# Patient Record
Sex: MALE | Race: WHITE | NOT HISPANIC OR LATINO | Employment: OTHER | URBAN - METROPOLITAN AREA
[De-identification: names, ages, dates, MRNs, and addresses within clinical notes are randomized per-mention and may not be internally consistent; named-entity substitution may affect disease eponyms.]

---

## 2024-08-10 ENCOUNTER — HOSPITAL ENCOUNTER (EMERGENCY)
Facility: HOSPITAL | Age: 84
Discharge: HOME | End: 2024-08-11
Attending: EMERGENCY MEDICINE
Payer: OTHER GOVERNMENT

## 2024-08-10 ENCOUNTER — APPOINTMENT (OUTPATIENT)
Dept: RADIOLOGY | Facility: HOSPITAL | Age: 84
End: 2024-08-10
Payer: OTHER GOVERNMENT

## 2024-08-10 DIAGNOSIS — R00.2 PALPITATIONS: Primary | ICD-10-CM

## 2024-08-10 LAB
ALBUMIN SERPL BCP-MCNC: 4.2 G/DL (ref 3.4–5)
ALP SERPL-CCNC: 89 U/L (ref 33–136)
ALT SERPL W P-5'-P-CCNC: 18 U/L (ref 10–52)
ANION GAP SERPL CALC-SCNC: 11 MMOL/L (ref 10–20)
AST SERPL W P-5'-P-CCNC: 26 U/L (ref 9–39)
BASOPHILS # BLD AUTO: 0.03 X10*3/UL (ref 0–0.1)
BASOPHILS NFR BLD AUTO: 0.3 %
BILIRUB SERPL-MCNC: 0.4 MG/DL (ref 0–1.2)
BUN SERPL-MCNC: 17 MG/DL (ref 6–23)
CALCIUM SERPL-MCNC: 10.2 MG/DL (ref 8.6–10.3)
CARDIAC TROPONIN I PNL SERPL HS: 40 NG/L (ref 0–20)
CHLORIDE SERPL-SCNC: 97 MMOL/L (ref 98–107)
CO2 SERPL-SCNC: 30 MMOL/L (ref 21–32)
CREAT SERPL-MCNC: 0.77 MG/DL (ref 0.5–1.3)
EGFRCR SERPLBLD CKD-EPI 2021: 88 ML/MIN/1.73M*2
EOSINOPHIL # BLD AUTO: 0.02 X10*3/UL (ref 0–0.4)
EOSINOPHIL NFR BLD AUTO: 0.2 %
ERYTHROCYTE [DISTWIDTH] IN BLOOD BY AUTOMATED COUNT: 12 % (ref 11.5–14.5)
GLUCOSE BLD MANUAL STRIP-MCNC: 132 MG/DL (ref 74–99)
GLUCOSE SERPL-MCNC: 145 MG/DL (ref 74–99)
HCT VFR BLD AUTO: 45.7 % (ref 41–52)
HGB BLD-MCNC: 15.1 G/DL (ref 13.5–17.5)
IMM GRANULOCYTES # BLD AUTO: 0.05 X10*3/UL (ref 0–0.5)
IMM GRANULOCYTES NFR BLD AUTO: 0.5 % (ref 0–0.9)
LYMPHOCYTES # BLD AUTO: 1.5 X10*3/UL (ref 0.8–3)
LYMPHOCYTES NFR BLD AUTO: 14.7 %
MAGNESIUM SERPL-MCNC: 1.77 MG/DL (ref 1.6–2.4)
MCH RBC QN AUTO: 30.9 PG (ref 26–34)
MCHC RBC AUTO-ENTMCNC: 33 G/DL (ref 32–36)
MCV RBC AUTO: 94 FL (ref 80–100)
MONOCYTES # BLD AUTO: 0.28 X10*3/UL (ref 0.05–0.8)
MONOCYTES NFR BLD AUTO: 2.8 %
NEUTROPHILS # BLD AUTO: 8.3 X10*3/UL (ref 1.6–5.5)
NEUTROPHILS NFR BLD AUTO: 81.5 %
NRBC BLD-RTO: 0 /100 WBCS (ref 0–0)
PHOSPHATE SERPL-MCNC: 2.7 MG/DL (ref 2.5–4.9)
PLATELET # BLD AUTO: 176 X10*3/UL (ref 150–450)
POTASSIUM SERPL-SCNC: 3.7 MMOL/L (ref 3.5–5.3)
PROT SERPL-MCNC: 8.4 G/DL (ref 6.4–8.2)
RBC # BLD AUTO: 4.88 X10*6/UL (ref 4.5–5.9)
SODIUM SERPL-SCNC: 134 MMOL/L (ref 136–145)
WBC # BLD AUTO: 10.2 X10*3/UL (ref 4.4–11.3)

## 2024-08-10 PROCEDURE — 84100 ASSAY OF PHOSPHORUS: CPT | Performed by: EMERGENCY MEDICINE

## 2024-08-10 PROCEDURE — 82947 ASSAY GLUCOSE BLOOD QUANT: CPT

## 2024-08-10 PROCEDURE — 96360 HYDRATION IV INFUSION INIT: CPT

## 2024-08-10 PROCEDURE — 83735 ASSAY OF MAGNESIUM: CPT | Performed by: EMERGENCY MEDICINE

## 2024-08-10 PROCEDURE — 85025 COMPLETE CBC W/AUTO DIFF WBC: CPT | Performed by: EMERGENCY MEDICINE

## 2024-08-10 PROCEDURE — 71045 X-RAY EXAM CHEST 1 VIEW: CPT

## 2024-08-10 PROCEDURE — 71045 X-RAY EXAM CHEST 1 VIEW: CPT | Performed by: RADIOLOGY

## 2024-08-10 PROCEDURE — 84484 ASSAY OF TROPONIN QUANT: CPT | Performed by: EMERGENCY MEDICINE

## 2024-08-10 PROCEDURE — 2500000004 HC RX 250 GENERAL PHARMACY W/ HCPCS (ALT 636 FOR OP/ED): Performed by: EMERGENCY MEDICINE

## 2024-08-10 PROCEDURE — 99283 EMERGENCY DEPT VISIT LOW MDM: CPT | Mod: 25

## 2024-08-10 PROCEDURE — 80053 COMPREHEN METABOLIC PANEL: CPT | Performed by: EMERGENCY MEDICINE

## 2024-08-10 PROCEDURE — 36415 COLL VENOUS BLD VENIPUNCTURE: CPT | Performed by: EMERGENCY MEDICINE

## 2024-08-10 RX ORDER — LANOLIN ALCOHOL/MO/W.PET/CERES
800 CREAM (GRAM) TOPICAL ONCE
Status: COMPLETED | OUTPATIENT
Start: 2024-08-10 | End: 2024-08-11

## 2024-08-10 ASSESSMENT — COLUMBIA-SUICIDE SEVERITY RATING SCALE - C-SSRS
1. IN THE PAST MONTH, HAVE YOU WISHED YOU WERE DEAD OR WISHED YOU COULD GO TO SLEEP AND NOT WAKE UP?: NO
6. HAVE YOU EVER DONE ANYTHING, STARTED TO DO ANYTHING, OR PREPARED TO DO ANYTHING TO END YOUR LIFE?: NO
2. HAVE YOU ACTUALLY HAD ANY THOUGHTS OF KILLING YOURSELF?: NO

## 2024-08-10 ASSESSMENT — PAIN - FUNCTIONAL ASSESSMENT: PAIN_FUNCTIONAL_ASSESSMENT: 0-10

## 2024-08-10 ASSESSMENT — LIFESTYLE VARIABLES
TOTAL SCORE: 0
EVER HAD A DRINK FIRST THING IN THE MORNING TO STEADY YOUR NERVES TO GET RID OF A HANGOVER: NO
EVER FELT BAD OR GUILTY ABOUT YOUR DRINKING: NO
HAVE YOU EVER FELT YOU SHOULD CUT DOWN ON YOUR DRINKING: NO
HAVE PEOPLE ANNOYED YOU BY CRITICIZING YOUR DRINKING: NO

## 2024-08-10 ASSESSMENT — PAIN SCALES - GENERAL: PAINLEVEL_OUTOF10: 0 - NO PAIN

## 2024-08-11 VITALS
TEMPERATURE: 98.2 F | RESPIRATION RATE: 18 BRPM | OXYGEN SATURATION: 99 % | SYSTOLIC BLOOD PRESSURE: 120 MMHG | HEIGHT: 64 IN | DIASTOLIC BLOOD PRESSURE: 73 MMHG | BODY MASS INDEX: 20.49 KG/M2 | HEART RATE: 80 BPM | WEIGHT: 120 LBS

## 2024-08-11 LAB — CARDIAC TROPONIN I PNL SERPL HS: 41 NG/L (ref 0–20)

## 2024-08-11 PROCEDURE — 2500000004 HC RX 250 GENERAL PHARMACY W/ HCPCS (ALT 636 FOR OP/ED): Performed by: EMERGENCY MEDICINE

## 2024-08-11 PROCEDURE — 36415 COLL VENOUS BLD VENIPUNCTURE: CPT | Performed by: EMERGENCY MEDICINE

## 2024-08-11 PROCEDURE — 84484 ASSAY OF TROPONIN QUANT: CPT | Performed by: EMERGENCY MEDICINE

## 2024-08-11 NOTE — ED TRIAGE NOTES
Pt had a procedure done yesterday, and today he forgot to take am metoprolol later felt like heart rate was racing rate 109 and then took metoprolol and rate came down. Denies pain now

## 2024-08-11 NOTE — ED PROVIDER NOTES
Emergency Department Provider Note        MEDICAL DECISION MAKING:  Medical Decision Making  Amount and/or Complexity of Data Reviewed  External Data Reviewed: notes.  Labs: ordered. Decision-making details documented in ED Course.  Radiology: ordered. Decision-making details documented in ED Course.  ECG/medicine tests: ordered and independent interpretation performed. Decision-making details documented in ED Course.    Risk  Decision regarding hospitalization.         8/10/24     Chief Complaint   Patient presents with    Rapid Heart Rate     Pt had a procedure done yesterday, and today he forgot to take am metoprolol later felt like heart rate was racing rate 109 and then took metoprolol and rate came down. Denies pain now         History/Exam limitations: none.   Additional history was obtained from patient and relative(s).    HPI: Maksim Palmer  is a 84 y.o. presents with fast heart rate that has now resolved.  Family concerned about dehydration.  Denies chest pain, shortness of breath, fever, chills, nausea, vomiting, abdominal pain or chest pain.  Past medical records, Past medical history and surgical history reviewed and as documented.  History reviewed and as noted. past medical records reviewed.    Active Ambulatory Problems     Diagnosis Date Noted    No Active Ambulatory Problems     Resolved Ambulatory Problems     Diagnosis Date Noted    No Resolved Ambulatory Problems     No Additional Past Medical History        History reviewed. No pertinent surgical history.     No family history on file.     Social History     Tobacco Use    Smoking status: Never    Smokeless tobacco: Never   Vaping Use    Vaping status: Never Used   Substance Use Topics    Alcohol use: Never    Drug use: Never        Allergies   Allergen Reactions    Heparin Anaphylaxis    Codeine Rash    Penicillin Rash        Unless otherwise stated in this report the patient's positive and negative responses for review of systems for  "constitutional, eyes, ENT, cardiovascular, respiratory, gastrointestinal, neurological, genitourinary, musculoskeletal, and integument systems and related systems to the presenting problem are either as stated in the HPI or were not pertinent or were negative for the symptoms and/or complaints related to the presenting medical problem.    PHYSICAL EXAM  Triage/nursing notes and vital signs reviewed as available and as noted    Vitals:    08/10/24 2141 08/10/24 2159 08/10/24 2215 08/10/24 2233   BP: 142/82  170/77 133/67   BP Location: Left arm      Patient Position: Sitting      Pulse: 76  71 71   Resp: 16  16 18   Temp: 36.8 °C (98.2 °F)      TempSrc: Temporal      SpO2: 97% 97% 97% 98%   Weight: 54.4 kg (120 lb)      Height: 1.626 m (5' 4\")            Vital Signs Reviewed and as noted above    General Appearance  Appears well  Alert  No Distress    Neurological  Alert and conversant   Appropriate orientation   Speech Clear   Moves all extremities spontaneously    Neck  Nml Inspection  No Kernig/Brudzinski's  Trachea midline    HEENT  Head Atraumatic  Eyes Nml Inspection  Mucosa Moist    Respiratory  Respirations nonlabored  Symmetrical expansion  Lungs sounds clear    Cardiovascular  Rate Normal  Rhythm regular  Normal heart sounds  Pulses full,equal,   Brisk Capillary Refill    Abdomen/Pelvis  Bowel Sounds Present  Abdomen soft  Non-Tender  No distention    Extremity  Normal Appearance  No Pedal Edema    Skin  Color Nml  Warm, Dry    Psychiatric  Cooperative, no apparent risk to self or others    ED COURSE  Current subjective and objective findings, differential diagnosis includes but not limited to pneumonia, acute coronary syndrome, NSTEMI, dehydration, cardiac arrhythmia      Work-up performed to evaluate for differential diagnosis as clinically indicated   Orders Placed This Encounter   Procedures    XR chest 1 view    CBC and Auto Differential    Phosphorus    Magnesium    Comprehensive metabolic panel    " Troponin I, High Sensitivity    Troponin I, High Sensitivity    Vital Signs    POCT GLUCOSE          Labs and imaging reviewed by me  and note           Labs Reviewed   CBC WITH AUTO DIFFERENTIAL - Abnormal       Result Value    WBC 10.2      nRBC 0.0      RBC 4.88      Hemoglobin 15.1      Hematocrit 45.7      MCV 94      MCH 30.9      MCHC 33.0      RDW 12.0      Platelets 176      Neutrophils % 81.5      Immature Granulocytes %, Automated 0.5      Lymphocytes % 14.7      Monocytes % 2.8      Eosinophils % 0.2      Basophils % 0.3      Neutrophils Absolute 8.30 (*)     Immature Granulocytes Absolute, Automated 0.05      Lymphocytes Absolute 1.50      Monocytes Absolute 0.28      Eosinophils Absolute 0.02      Basophils Absolute 0.03     COMPREHENSIVE METABOLIC PANEL - Abnormal    Glucose 145 (*)     Sodium 134 (*)     Potassium 3.7      Chloride 97 (*)     Bicarbonate 30      Anion Gap 11      Urea Nitrogen 17      Creatinine 0.77      eGFR 88      Calcium 10.2      Albumin 4.2      Alkaline Phosphatase 89      Total Protein 8.4 (*)     AST 26      Bilirubin, Total 0.4      ALT 18     TROPONIN I, HIGH SENSITIVITY - Abnormal    Troponin I, High Sensitivity 40 (*)     Narrative:     Less than 99th percentile of normal range cutoff-  Female and children under 18 years old <14 ng/L; Male <21 ng/L: Negative  Repeat testing should be performed if clinically indicated.     Female and children under 18 years old 14-50 ng/L; Male 21-50 ng/L:  Consistent with possible cardiac damage and possible increased clinical   risk. Serial measurements may help to assess extent of myocardial damage.     >50 ng/L: Consistent with cardiac damage, increased clinical risk and  myocardial infarction. Serial measurements may help assess extent of   myocardial damage.      NOTE: Children less than 1 year old may have higher baseline troponin   levels and results should be interpreted in conjunction with the overall   clinical context.      NOTE: Troponin I testing is performed using a different   testing methodology at [facility] than at other   [facility] hospitals. Direct result comparisons should only   be made within the same method.   POCT GLUCOSE - Abnormal    POCT Glucose 132 (*)    PHOSPHORUS - Normal    Phosphorus 2.7     MAGNESIUM - Normal    Magnesium 1.77     TROPONIN I, HIGH SENSITIVITY        XR chest 1 view   Final Result   No acute cardiopulmonary process.        MACRO:   None        Signed by: Audrey Rosales 8/10/2024 11:37 PM   Dictation workstation:   [id number]               Pt course which Intervention and treatment included :    Procedure  Procedures    Medications   magnesium oxide (Mag-Ox) tablet 800 mg (has no administration in time range)   sodium chloride 0.9 % bolus 500 mL (500 mL intravenous New Bag 8/10/24 2304)        ED Course as of 08/11/24 0006   Sat Aug 10, 2024   2239 Atrial paced rhythm 72 beats minute.  Normal intervals.  No ST elevations or depressions [ML]   2336 Work obtained to the patient tachycardia with no he has normal tensive and normal cardiac at this time.  Troponin is 40.  Labs otherwise unremarkable.  Magnesium 1.7 will replace.  Patient exhibits no chest pain or shortness of breath.  Repeat troponin to decide final disposition [ML]      ED Course User Index  [ML] Marty R Lejeune, DO         Diagnoses as of 08/11/24 0006   Palpitations          DISPOSITION: Signed out to oncoming provider at conclusion my shift pending second troponin to decide final disposition.  Care resumed by Dr. Vo    1. Palpitations           -------------------------------------------------------------------    08/10/24 at 10:39 PM - Marty R LeJeune, DO   Internal & Emergency Medicine          Marty R Lejeune, DO  Resident  08/11/24 0006

## 2025-01-16 ENCOUNTER — APPOINTMENT (OUTPATIENT)
Dept: CARDIOLOGY | Facility: HOSPITAL | Age: 85
End: 2025-01-16
Payer: COMMERCIAL

## 2025-01-16 ENCOUNTER — HOSPITAL ENCOUNTER (OUTPATIENT)
Facility: HOSPITAL | Age: 85
Setting detail: OBSERVATION
Discharge: HOME | End: 2025-01-18
Attending: EMERGENCY MEDICINE | Admitting: INTERNAL MEDICINE
Payer: COMMERCIAL

## 2025-01-16 ENCOUNTER — APPOINTMENT (OUTPATIENT)
Dept: RADIOLOGY | Facility: HOSPITAL | Age: 85
End: 2025-01-16
Payer: COMMERCIAL

## 2025-01-16 DIAGNOSIS — I10 PRIMARY HYPERTENSION: ICD-10-CM

## 2025-01-16 DIAGNOSIS — R00.2 PALPITATIONS: Primary | ICD-10-CM

## 2025-01-16 LAB
ALBUMIN SERPL BCP-MCNC: 4.3 G/DL (ref 3.4–5)
ALP SERPL-CCNC: 86 U/L (ref 33–136)
ALT SERPL W P-5'-P-CCNC: 18 U/L (ref 10–52)
ANION GAP SERPL CALC-SCNC: 12 MMOL/L (ref 10–20)
AST SERPL W P-5'-P-CCNC: 34 U/L (ref 9–39)
BASOPHILS # BLD AUTO: 0.02 X10*3/UL (ref 0–0.1)
BASOPHILS NFR BLD AUTO: 0.2 %
BILIRUB SERPL-MCNC: 0.5 MG/DL (ref 0–1.2)
BNP SERPL-MCNC: 157 PG/ML (ref 0–99)
BUN SERPL-MCNC: 31 MG/DL (ref 6–23)
CALCIUM SERPL-MCNC: 10.4 MG/DL (ref 8.6–10.3)
CARDIAC TROPONIN I PNL SERPL HS: 40 NG/L (ref 0–20)
CARDIAC TROPONIN I PNL SERPL HS: 42 NG/L (ref 0–20)
CHLORIDE SERPL-SCNC: 99 MMOL/L (ref 98–107)
CO2 SERPL-SCNC: 30 MMOL/L (ref 21–32)
CREAT SERPL-MCNC: 0.73 MG/DL (ref 0.5–1.3)
EGFRCR SERPLBLD CKD-EPI 2021: 89 ML/MIN/1.73M*2
EOSINOPHIL # BLD AUTO: 0 X10*3/UL (ref 0–0.4)
EOSINOPHIL NFR BLD AUTO: 0 %
ERYTHROCYTE [DISTWIDTH] IN BLOOD BY AUTOMATED COUNT: 12.4 % (ref 11.5–14.5)
GLUCOSE SERPL-MCNC: 146 MG/DL (ref 74–99)
HCT VFR BLD AUTO: 52.6 % (ref 41–52)
HGB BLD-MCNC: 17.7 G/DL (ref 13.5–17.5)
IMM GRANULOCYTES # BLD AUTO: 0.05 X10*3/UL (ref 0–0.5)
IMM GRANULOCYTES NFR BLD AUTO: 0.4 % (ref 0–0.9)
LYMPHOCYTES # BLD AUTO: 1.16 X10*3/UL (ref 0.8–3)
LYMPHOCYTES NFR BLD AUTO: 9.4 %
MCH RBC QN AUTO: 31.7 PG (ref 26–34)
MCHC RBC AUTO-ENTMCNC: 33.7 G/DL (ref 32–36)
MCV RBC AUTO: 94 FL (ref 80–100)
MONOCYTES # BLD AUTO: 0.39 X10*3/UL (ref 0.05–0.8)
MONOCYTES NFR BLD AUTO: 3.2 %
NEUTROPHILS # BLD AUTO: 10.74 X10*3/UL (ref 1.6–5.5)
NEUTROPHILS NFR BLD AUTO: 86.8 %
NRBC BLD-RTO: 0 /100 WBCS (ref 0–0)
PLATELET # BLD AUTO: 186 X10*3/UL (ref 150–450)
POTASSIUM SERPL-SCNC: 5.1 MMOL/L (ref 3.5–5.3)
PROT SERPL-MCNC: 8.5 G/DL (ref 6.4–8.2)
RBC # BLD AUTO: 5.59 X10*6/UL (ref 4.5–5.9)
SODIUM SERPL-SCNC: 136 MMOL/L (ref 136–145)
WBC # BLD AUTO: 12.4 X10*3/UL (ref 4.4–11.3)

## 2025-01-16 PROCEDURE — 83880 ASSAY OF NATRIURETIC PEPTIDE: CPT | Performed by: EMERGENCY MEDICINE

## 2025-01-16 PROCEDURE — 71046 X-RAY EXAM CHEST 2 VIEWS: CPT | Mod: FOREIGN READ | Performed by: RADIOLOGY

## 2025-01-16 PROCEDURE — 84439 ASSAY OF FREE THYROXINE: CPT | Performed by: INTERNAL MEDICINE

## 2025-01-16 PROCEDURE — 84443 ASSAY THYROID STIM HORMONE: CPT | Performed by: INTERNAL MEDICINE

## 2025-01-16 PROCEDURE — 36415 COLL VENOUS BLD VENIPUNCTURE: CPT | Performed by: EMERGENCY MEDICINE

## 2025-01-16 PROCEDURE — 84484 ASSAY OF TROPONIN QUANT: CPT | Performed by: EMERGENCY MEDICINE

## 2025-01-16 PROCEDURE — 93005 ELECTROCARDIOGRAM TRACING: CPT

## 2025-01-16 PROCEDURE — 84075 ASSAY ALKALINE PHOSPHATASE: CPT | Performed by: EMERGENCY MEDICINE

## 2025-01-16 PROCEDURE — 2500000001 HC RX 250 WO HCPCS SELF ADMINISTERED DRUGS (ALT 637 FOR MEDICARE OP): Performed by: EMERGENCY MEDICINE

## 2025-01-16 PROCEDURE — 71046 X-RAY EXAM CHEST 2 VIEWS: CPT

## 2025-01-16 PROCEDURE — 99285 EMERGENCY DEPT VISIT HI MDM: CPT | Mod: 25 | Performed by: EMERGENCY MEDICINE

## 2025-01-16 PROCEDURE — 85025 COMPLETE CBC W/AUTO DIFF WBC: CPT | Performed by: EMERGENCY MEDICINE

## 2025-01-16 RX ORDER — NAPROXEN SODIUM 220 MG/1
324 TABLET, FILM COATED ORAL ONCE
Status: COMPLETED | OUTPATIENT
Start: 2025-01-16 | End: 2025-01-16

## 2025-01-16 RX ADMIN — ASPIRIN 81 MG CHEWABLE TABLET 324 MG: 81 TABLET CHEWABLE at 22:14

## 2025-01-16 ASSESSMENT — PAIN - FUNCTIONAL ASSESSMENT: PAIN_FUNCTIONAL_ASSESSMENT: 0-10

## 2025-01-16 ASSESSMENT — LIFESTYLE VARIABLES
HAVE YOU EVER FELT YOU SHOULD CUT DOWN ON YOUR DRINKING: NO
HAVE PEOPLE ANNOYED YOU BY CRITICIZING YOUR DRINKING: NO
EVER FELT BAD OR GUILTY ABOUT YOUR DRINKING: NO
EVER HAD A DRINK FIRST THING IN THE MORNING TO STEADY YOUR NERVES TO GET RID OF A HANGOVER: NO
TOTAL SCORE: 0

## 2025-01-16 ASSESSMENT — COLUMBIA-SUICIDE SEVERITY RATING SCALE - C-SSRS
6. HAVE YOU EVER DONE ANYTHING, STARTED TO DO ANYTHING, OR PREPARED TO DO ANYTHING TO END YOUR LIFE?: NO
1. IN THE PAST MONTH, HAVE YOU WISHED YOU WERE DEAD OR WISHED YOU COULD GO TO SLEEP AND NOT WAKE UP?: NO
2. HAVE YOU ACTUALLY HAD ANY THOUGHTS OF KILLING YOURSELF?: NO

## 2025-01-16 ASSESSMENT — PAIN SCALES - GENERAL: PAINLEVEL_OUTOF10: 0 - NO PAIN

## 2025-01-16 NOTE — ED TRIAGE NOTES
Pt had endsocopy on Tuesday, has not been feeling right since. State he feels more weak, has been checking vitals at home and noticed his BP and heart rate were both elevated, decided to seek ER treatment.

## 2025-01-17 ENCOUNTER — APPOINTMENT (OUTPATIENT)
Dept: CARDIOLOGY | Facility: HOSPITAL | Age: 85
End: 2025-01-17
Payer: COMMERCIAL

## 2025-01-17 PROBLEM — R00.2 PALPITATIONS: Status: ACTIVE | Noted: 2025-01-17

## 2025-01-17 LAB
ALBUMIN SERPL BCP-MCNC: 3.6 G/DL (ref 3.4–5)
ALP SERPL-CCNC: 71 U/L (ref 33–136)
ALT SERPL W P-5'-P-CCNC: 15 U/L (ref 10–52)
ANION GAP SERPL CALC-SCNC: 12 MMOL/L (ref 10–20)
AORTIC VALVE MEAN GRADIENT: 13 MMHG
AORTIC VALVE PEAK VELOCITY: 2.58 M/S
APPEARANCE UR: CLEAR
AST SERPL W P-5'-P-CCNC: 23 U/L (ref 9–39)
ATRIAL RATE: 77 BPM
AV PEAK GRADIENT: 27 MMHG
AVA (PEAK VEL): 0.99 CM2
AVA (VTI): 1 CM2
BASOPHILS # BLD AUTO: 0.02 X10*3/UL (ref 0–0.1)
BASOPHILS NFR BLD AUTO: 0.2 %
BILIRUB SERPL-MCNC: 0.4 MG/DL (ref 0–1.2)
BILIRUB UR STRIP.AUTO-MCNC: NEGATIVE MG/DL
BUN SERPL-MCNC: 30 MG/DL (ref 6–23)
CA-I BLD-SCNC: NORMAL MMOL/L
CALCIUM SERPL-MCNC: 9.6 MG/DL (ref 8.6–10.3)
CAOX CRY #/AREA UR COMP ASSIST: ABNORMAL /HPF
CARDIAC TROPONIN I PNL SERPL HS: 46 NG/L (ref 0–20)
CHLORIDE SERPL-SCNC: 102 MMOL/L (ref 98–107)
CO2 SERPL-SCNC: 28 MMOL/L (ref 21–32)
COHGB MFR BLDV: 2.6 %
COLOR UR: ABNORMAL
CREAT SERPL-MCNC: 0.64 MG/DL (ref 0.5–1.3)
EGFRCR SERPLBLD CKD-EPI 2021: >90 ML/MIN/1.73M*2
EJECTION FRACTION APICAL 4 CHAMBER: 67.5
EJECTION FRACTION: 68 %
EOSINOPHIL # BLD AUTO: 0.01 X10*3/UL (ref 0–0.4)
EOSINOPHIL NFR BLD AUTO: 0.1 %
ERYTHROCYTE [DISTWIDTH] IN BLOOD BY AUTOMATED COUNT: 12.7 % (ref 11.5–14.5)
EST. AVERAGE GLUCOSE BLD GHB EST-MCNC: 117 MG/DL
GLUCOSE SERPL-MCNC: 114 MG/DL (ref 74–99)
GLUCOSE UR STRIP.AUTO-MCNC: NORMAL MG/DL
HBA1C MFR BLD: 5.7 %
HCT VFR BLD AUTO: 44.9 % (ref 41–52)
HGB BLD-MCNC: 15 G/DL (ref 13.5–17.5)
HYALINE CASTS #/AREA URNS AUTO: ABNORMAL /LPF
IMM GRANULOCYTES # BLD AUTO: 0.05 X10*3/UL (ref 0–0.5)
IMM GRANULOCYTES NFR BLD AUTO: 0.4 % (ref 0–0.9)
KETONES UR STRIP.AUTO-MCNC: ABNORMAL MG/DL
LEFT ATRIUM VOLUME AREA LENGTH INDEX BSA: 28.7 ML/M2
LEFT VENTRICLE INTERNAL DIMENSION DIASTOLE: 4.05 CM (ref 3.5–6)
LEFT VENTRICULAR OUTFLOW TRACT DIAMETER: 1.77 CM
LEUKOCYTE ESTERASE UR QL STRIP.AUTO: NEGATIVE
LV EJECTION FRACTION BIPLANE: 74 %
LYMPHOCYTES # BLD AUTO: 1.9 X10*3/UL (ref 0.8–3)
LYMPHOCYTES NFR BLD AUTO: 16.1 %
MAGNESIUM SERPL-MCNC: 1.95 MG/DL (ref 1.6–2.4)
MCH RBC QN AUTO: 30.3 PG (ref 26–34)
MCHC RBC AUTO-ENTMCNC: 33.4 G/DL (ref 32–36)
MCV RBC AUTO: 91 FL (ref 80–100)
METHGB MFR BLDV: 0.4 % (ref 0–1.5)
MITRAL VALVE E/A RATIO: 0.84
MONOCYTES # BLD AUTO: 0.84 X10*3/UL (ref 0.05–0.8)
MONOCYTES NFR BLD AUTO: 7.1 %
MUCOUS THREADS #/AREA URNS AUTO: ABNORMAL /LPF
NEUTROPHILS # BLD AUTO: 8.98 X10*3/UL (ref 1.6–5.5)
NEUTROPHILS NFR BLD AUTO: 76.1 %
NITRITE UR QL STRIP.AUTO: NEGATIVE
NRBC BLD-RTO: 0 /100 WBCS (ref 0–0)
P AXIS: -14 DEGREES
PH UR STRIP.AUTO: 6 [PH]
PLATELET # BLD AUTO: 163 X10*3/UL (ref 150–450)
POTASSIUM SERPL-SCNC: 3.6 MMOL/L (ref 3.5–5.3)
PR INTERVAL: 211 MS
PROT SERPL-MCNC: 6.8 G/DL (ref 6.4–8.2)
PROT UR STRIP.AUTO-MCNC: ABNORMAL MG/DL
PTH-INTACT SERPL-MCNC: 33.8 PG/ML (ref 18.5–88)
Q ONSET: 249 MS
QRS COUNT: 12 BEATS
QRS DURATION: 153 MS
QT INTERVAL: 443 MS
QTC CALCULATION(BAZETT): 499 MS
QTC FREDERICIA: 479 MS
R AXIS: -89 DEGREES
RBC # BLD AUTO: 4.95 X10*6/UL (ref 4.5–5.9)
RBC # UR STRIP.AUTO: NEGATIVE /UL
RBC #/AREA URNS AUTO: ABNORMAL /HPF
RIGHT VENTRICLE FREE WALL PEAK S': 10.2 CM/S
RIGHT VENTRICLE PEAK SYSTOLIC PRESSURE: 44.2 MMHG
SODIUM SERPL-SCNC: 138 MMOL/L (ref 136–145)
SP GR UR STRIP.AUTO: 1.03
T AXIS: 82 DEGREES
T OFFSET: 471 MS
T4 FREE SERPL-MCNC: 0.98 NG/DL (ref 0.61–1.12)
TRICUSPID ANNULAR PLANE SYSTOLIC EXCURSION: 1.4 CM
TSH SERPL-ACNC: 0.39 MIU/L (ref 0.44–3.98)
UROBILINOGEN UR STRIP.AUTO-MCNC: NORMAL MG/DL
VENTRICULAR RATE: 76 BPM
WBC # BLD AUTO: 11.8 X10*3/UL (ref 4.4–11.3)
WBC #/AREA URNS AUTO: ABNORMAL /HPF

## 2025-01-17 PROCEDURE — 99223 1ST HOSP IP/OBS HIGH 75: CPT | Performed by: INTERNAL MEDICINE

## 2025-01-17 PROCEDURE — 85025 COMPLETE CBC W/AUTO DIFF WBC: CPT | Performed by: INTERNAL MEDICINE

## 2025-01-17 PROCEDURE — 96374 THER/PROPH/DIAG INJ IV PUSH: CPT | Mod: 59

## 2025-01-17 PROCEDURE — 82375 ASSAY CARBOXYHB QUANT: CPT | Performed by: INTERNAL MEDICINE

## 2025-01-17 PROCEDURE — C8929 TTE W OR WO FOL WCON,DOPPLER: HCPCS

## 2025-01-17 PROCEDURE — 83735 ASSAY OF MAGNESIUM: CPT | Performed by: INTERNAL MEDICINE

## 2025-01-17 PROCEDURE — 83036 HEMOGLOBIN GLYCOSYLATED A1C: CPT | Mod: PORLAB | Performed by: INTERNAL MEDICINE

## 2025-01-17 PROCEDURE — 96361 HYDRATE IV INFUSION ADD-ON: CPT

## 2025-01-17 PROCEDURE — G0378 HOSPITAL OBSERVATION PER HR: HCPCS

## 2025-01-17 PROCEDURE — 36415 COLL VENOUS BLD VENIPUNCTURE: CPT | Performed by: INTERNAL MEDICINE

## 2025-01-17 PROCEDURE — 83970 ASSAY OF PARATHORMONE: CPT | Mod: PORLAB | Performed by: INTERNAL MEDICINE

## 2025-01-17 PROCEDURE — 82668 ASSAY OF ERYTHROPOIETIN: CPT | Performed by: INTERNAL MEDICINE

## 2025-01-17 PROCEDURE — 2500000001 HC RX 250 WO HCPCS SELF ADMINISTERED DRUGS (ALT 637 FOR MEDICARE OP): Performed by: INTERNAL MEDICINE

## 2025-01-17 PROCEDURE — 99223 1ST HOSP IP/OBS HIGH 75: CPT | Performed by: STUDENT IN AN ORGANIZED HEALTH CARE EDUCATION/TRAINING PROGRAM

## 2025-01-17 PROCEDURE — 2500000004 HC RX 250 GENERAL PHARMACY W/ HCPCS (ALT 636 FOR OP/ED): Performed by: INTERNAL MEDICINE

## 2025-01-17 PROCEDURE — 99233 SBSQ HOSP IP/OBS HIGH 50: CPT | Performed by: INTERNAL MEDICINE

## 2025-01-17 PROCEDURE — 80053 COMPREHEN METABOLIC PANEL: CPT | Performed by: INTERNAL MEDICINE

## 2025-01-17 PROCEDURE — 81001 URINALYSIS AUTO W/SCOPE: CPT | Performed by: INTERNAL MEDICINE

## 2025-01-17 PROCEDURE — 93306 TTE W/DOPPLER COMPLETE: CPT | Performed by: STUDENT IN AN ORGANIZED HEALTH CARE EDUCATION/TRAINING PROGRAM

## 2025-01-17 PROCEDURE — 97165 OT EVAL LOW COMPLEX 30 MIN: CPT | Mod: GO

## 2025-01-17 PROCEDURE — 84484 ASSAY OF TROPONIN QUANT: CPT | Performed by: INTERNAL MEDICINE

## 2025-01-17 PROCEDURE — 82330 ASSAY OF CALCIUM: CPT | Performed by: INTERNAL MEDICINE

## 2025-01-17 RX ORDER — OMEPRAZOLE 20 MG/1
20 CAPSULE, DELAYED RELEASE ORAL
COMMUNITY
Start: 2024-04-22

## 2025-01-17 RX ORDER — ASPIRIN 81 MG/1
81 TABLET ORAL DAILY
COMMUNITY

## 2025-01-17 RX ORDER — ONDANSETRON 4 MG/1
4 TABLET, ORALLY DISINTEGRATING ORAL EVERY 8 HOURS PRN
Status: DISCONTINUED | OUTPATIENT
Start: 2025-01-17 | End: 2025-01-18 | Stop reason: HOSPADM

## 2025-01-17 RX ORDER — METOPROLOL TARTRATE 25 MG/1
1 TABLET, FILM COATED ORAL 2 TIMES DAILY
COMMUNITY
Start: 2024-04-22

## 2025-01-17 RX ORDER — TALC
3 POWDER (GRAM) TOPICAL NIGHTLY PRN
Status: DISCONTINUED | OUTPATIENT
Start: 2025-01-17 | End: 2025-01-18 | Stop reason: HOSPADM

## 2025-01-17 RX ORDER — ONDANSETRON HYDROCHLORIDE 2 MG/ML
4 INJECTION, SOLUTION INTRAVENOUS EVERY 8 HOURS PRN
Status: DISCONTINUED | OUTPATIENT
Start: 2025-01-17 | End: 2025-01-18 | Stop reason: HOSPADM

## 2025-01-17 RX ORDER — ACETAMINOPHEN 160 MG/5ML
650 SOLUTION ORAL EVERY 4 HOURS PRN
Status: DISCONTINUED | OUTPATIENT
Start: 2025-01-17 | End: 2025-01-18 | Stop reason: HOSPADM

## 2025-01-17 RX ORDER — METOPROLOL TARTRATE 25 MG/1
25 TABLET, FILM COATED ORAL 2 TIMES DAILY
Status: DISCONTINUED | OUTPATIENT
Start: 2025-01-17 | End: 2025-01-18 | Stop reason: HOSPADM

## 2025-01-17 RX ORDER — ACETAMINOPHEN 650 MG/1
650 SUPPOSITORY RECTAL EVERY 4 HOURS PRN
Status: DISCONTINUED | OUTPATIENT
Start: 2025-01-17 | End: 2025-01-18 | Stop reason: HOSPADM

## 2025-01-17 RX ORDER — NAPROXEN SODIUM 220 MG/1
81 TABLET, FILM COATED ORAL DAILY
Status: DISCONTINUED | OUTPATIENT
Start: 2025-01-17 | End: 2025-01-18 | Stop reason: HOSPADM

## 2025-01-17 RX ORDER — AMLODIPINE BESYLATE 2.5 MG/1
2.5 TABLET ORAL DAILY
Status: DISCONTINUED | OUTPATIENT
Start: 2025-01-17 | End: 2025-01-18 | Stop reason: HOSPADM

## 2025-01-17 RX ORDER — SODIUM CHLORIDE 9 MG/ML
100 INJECTION, SOLUTION INTRAVENOUS CONTINUOUS
Status: ACTIVE | OUTPATIENT
Start: 2025-01-17 | End: 2025-01-17

## 2025-01-17 RX ORDER — ACETAMINOPHEN 325 MG/1
650 TABLET ORAL EVERY 4 HOURS PRN
Status: DISCONTINUED | OUTPATIENT
Start: 2025-01-17 | End: 2025-01-18 | Stop reason: HOSPADM

## 2025-01-17 RX ORDER — PANTOPRAZOLE SODIUM 40 MG/1
40 TABLET, DELAYED RELEASE ORAL
Status: DISCONTINUED | OUTPATIENT
Start: 2025-01-17 | End: 2025-01-18 | Stop reason: HOSPADM

## 2025-01-17 RX ORDER — POLYETHYLENE GLYCOL 3350 17 G/17G
17 POWDER, FOR SOLUTION ORAL DAILY PRN
Status: DISCONTINUED | OUTPATIENT
Start: 2025-01-17 | End: 2025-01-18 | Stop reason: HOSPADM

## 2025-01-17 RX ADMIN — HUMAN ALBUMIN MICROSPHERES AND PERFLUTREN 0.5 ML: 10; .22 INJECTION, SOLUTION INTRAVENOUS at 14:45

## 2025-01-17 RX ADMIN — ONDANSETRON 4 MG: 2 INJECTION INTRAMUSCULAR; INTRAVENOUS at 20:01

## 2025-01-17 RX ADMIN — SODIUM CHLORIDE 100 ML/HR: 9 INJECTION, SOLUTION INTRAVENOUS at 03:47

## 2025-01-17 RX ADMIN — METOPROLOL TARTRATE 25 MG: 25 TABLET, FILM COATED ORAL at 20:01

## 2025-01-17 RX ADMIN — PANTOPRAZOLE SODIUM 40 MG: 40 TABLET, DELAYED RELEASE ORAL at 07:49

## 2025-01-17 RX ADMIN — METOPROLOL TARTRATE 25 MG: 25 TABLET, FILM COATED ORAL at 08:12

## 2025-01-17 RX ADMIN — AMLODIPINE BESYLATE 2.5 MG: 2.5 TABLET ORAL at 17:16

## 2025-01-17 RX ADMIN — ASPIRIN 81 MG CHEWABLE TABLET 81 MG: 81 TABLET CHEWABLE at 08:11

## 2025-01-17 RX ADMIN — PANTOPRAZOLE SODIUM 40 MG: 40 TABLET, DELAYED RELEASE ORAL at 17:16

## 2025-01-17 SDOH — SOCIAL STABILITY: SOCIAL INSECURITY: HAS ANYONE EVER THREATENED TO HURT YOUR FAMILY OR YOUR PETS?: NO

## 2025-01-17 SDOH — ECONOMIC STABILITY: FOOD INSECURITY: WITHIN THE PAST 12 MONTHS, THE FOOD YOU BOUGHT JUST DIDN'T LAST AND YOU DIDN'T HAVE MONEY TO GET MORE.: NEVER TRUE

## 2025-01-17 SDOH — SOCIAL STABILITY: SOCIAL INSECURITY
WITHIN THE LAST YEAR, HAVE YOU BEEN RAPED OR FORCED TO HAVE ANY KIND OF SEXUAL ACTIVITY BY YOUR PARTNER OR EX-PARTNER?: NO

## 2025-01-17 SDOH — SOCIAL STABILITY: SOCIAL INSECURITY: WITHIN THE LAST YEAR, HAVE YOU BEEN HUMILIATED OR EMOTIONALLY ABUSED IN OTHER WAYS BY YOUR PARTNER OR EX-PARTNER?: NO

## 2025-01-17 SDOH — HEALTH STABILITY: MENTAL HEALTH: HOW MANY DRINKS CONTAINING ALCOHOL DO YOU HAVE ON A TYPICAL DAY WHEN YOU ARE DRINKING?: PATIENT DOES NOT DRINK

## 2025-01-17 SDOH — SOCIAL STABILITY: SOCIAL INSECURITY: HAVE YOU HAD THOUGHTS OF HARMING ANYONE ELSE?: NO

## 2025-01-17 SDOH — ECONOMIC STABILITY: FOOD INSECURITY: WITHIN THE PAST 12 MONTHS, YOU WORRIED THAT YOUR FOOD WOULD RUN OUT BEFORE YOU GOT THE MONEY TO BUY MORE.: NEVER TRUE

## 2025-01-17 SDOH — HEALTH STABILITY: MENTAL HEALTH: HOW OFTEN DO YOU HAVE SIX OR MORE DRINKS ON ONE OCCASION?: NEVER

## 2025-01-17 SDOH — SOCIAL STABILITY: SOCIAL INSECURITY: ARE THERE ANY APPARENT SIGNS OF INJURIES/BEHAVIORS THAT COULD BE RELATED TO ABUSE/NEGLECT?: NO

## 2025-01-17 SDOH — ECONOMIC STABILITY: INCOME INSECURITY: IN THE PAST 12 MONTHS HAS THE ELECTRIC, GAS, OIL, OR WATER COMPANY THREATENED TO SHUT OFF SERVICES IN YOUR HOME?: NO

## 2025-01-17 SDOH — SOCIAL STABILITY: SOCIAL INSECURITY
WITHIN THE LAST YEAR, HAVE YOU BEEN KICKED, HIT, SLAPPED, OR OTHERWISE PHYSICALLY HURT BY YOUR PARTNER OR EX-PARTNER?: NO

## 2025-01-17 SDOH — HEALTH STABILITY: MENTAL HEALTH: HOW OFTEN DO YOU HAVE A DRINK CONTAINING ALCOHOL?: NEVER

## 2025-01-17 SDOH — SOCIAL STABILITY: SOCIAL INSECURITY: WITHIN THE LAST YEAR, HAVE YOU BEEN AFRAID OF YOUR PARTNER OR EX-PARTNER?: NO

## 2025-01-17 SDOH — SOCIAL STABILITY: SOCIAL INSECURITY: DO YOU FEEL UNSAFE GOING BACK TO THE PLACE WHERE YOU ARE LIVING?: NO

## 2025-01-17 SDOH — SOCIAL STABILITY: SOCIAL INSECURITY: DO YOU FEEL ANYONE HAS EXPLOITED OR TAKEN ADVANTAGE OF YOU FINANCIALLY OR OF YOUR PERSONAL PROPERTY?: NO

## 2025-01-17 SDOH — SOCIAL STABILITY: SOCIAL INSECURITY: ARE YOU OR HAVE YOU BEEN THREATENED OR ABUSED PHYSICALLY, EMOTIONALLY, OR SEXUALLY BY ANYONE?: NO

## 2025-01-17 SDOH — SOCIAL STABILITY: SOCIAL INSECURITY: WERE YOU ABLE TO COMPLETE ALL THE BEHAVIORAL HEALTH SCREENINGS?: YES

## 2025-01-17 SDOH — SOCIAL STABILITY: SOCIAL INSECURITY: ABUSE: ADULT

## 2025-01-17 SDOH — SOCIAL STABILITY: SOCIAL INSECURITY: DOES ANYONE TRY TO KEEP YOU FROM HAVING/CONTACTING OTHER FRIENDS OR DOING THINGS OUTSIDE YOUR HOME?: NO

## 2025-01-17 ASSESSMENT — COGNITIVE AND FUNCTIONAL STATUS - GENERAL
HELP NEEDED FOR BATHING: A LITTLE
MOBILITY SCORE: 24
TOILETING: A LITTLE
DAILY ACTIVITIY SCORE: 24
DRESSING REGULAR UPPER BODY CLOTHING: A LITTLE
DAILY ACTIVITIY SCORE: 24
PATIENT BASELINE BEDBOUND: NO
MOBILITY SCORE: 24
MOBILITY SCORE: 24
DAILY ACTIVITIY SCORE: 24
DAILY ACTIVITIY SCORE: 20
DRESSING REGULAR LOWER BODY CLOTHING: A LITTLE

## 2025-01-17 ASSESSMENT — ENCOUNTER SYMPTOMS
CHILLS: 0
DIARRHEA: 0
ARTHRALGIAS: 0
FEVER: 0
ABDOMINAL PAIN: 0
HEMATURIA: 0
VOMITING: 0
NAUSEA: 0
SHORTNESS OF BREATH: 0
DYSPHORIC MOOD: 0
DYSURIA: 0
HEADACHES: 0
WOUND: 0
BACK PAIN: 0
FATIGUE: 1
DIZZINESS: 0
PALPITATIONS: 1
COUGH: 0
NERVOUS/ANXIOUS: 0
WEAKNESS: 1
SORE THROAT: 0
EYE PAIN: 0

## 2025-01-17 ASSESSMENT — PAIN SCALES - GENERAL
PAINLEVEL_OUTOF10: 0 - NO PAIN

## 2025-01-17 ASSESSMENT — ACTIVITIES OF DAILY LIVING (ADL)
FEEDING YOURSELF: INDEPENDENT
ADEQUATE_TO_COMPLETE_ADL: YES
TOILETING: INDEPENDENT
PATIENT'S MEMORY ADEQUATE TO SAFELY COMPLETE DAILY ACTIVITIES?: YES
LACK_OF_TRANSPORTATION: NO
JUDGMENT_ADEQUATE_SAFELY_COMPLETE_DAILY_ACTIVITIES: YES
BATHING: INDEPENDENT
GROOMING: INDEPENDENT
HEARING - LEFT EAR: FUNCTIONAL
DRESSING YOURSELF: INDEPENDENT
WALKS IN HOME: INDEPENDENT
ADL_ASSISTANCE: INDEPENDENT
BATHING_ASSISTANCE: MINIMAL
HEARING - RIGHT EAR: FUNCTIONAL

## 2025-01-17 ASSESSMENT — PAIN - FUNCTIONAL ASSESSMENT
PAIN_FUNCTIONAL_ASSESSMENT: 0-10

## 2025-01-17 ASSESSMENT — PATIENT HEALTH QUESTIONNAIRE - PHQ9
1. LITTLE INTEREST OR PLEASURE IN DOING THINGS: NOT AT ALL
SUM OF ALL RESPONSES TO PHQ9 QUESTIONS 1 & 2: 0
2. FEELING DOWN, DEPRESSED OR HOPELESS: NOT AT ALL

## 2025-01-17 ASSESSMENT — LIFESTYLE VARIABLES
SKIP TO QUESTIONS 9-10: 1
AUDIT-C TOTAL SCORE: 0

## 2025-01-17 NOTE — CONSULTS
Nutrition Initial Assessment:   Nutrition Assessment    Reason for Assessment: Admission nursing screening    Medical history per chart: Maksim Palmer is a 84 y.o. male with a history of aortic valve stenosis status post replacement, AV block status post pacemaker, Ybrd's esophagus, hyperlipidemia, depression, osteoarthritis, coronary artery disease, anxiety disorder, irritable bowel syndrome, and hypertension. He presented to the emergency department late on 1/16 noting feeling poorly at home since he had EGD for his Byrd's esophagus on Tuesday, 1/14.     1/17: Patient reports he has not been eating well since his EGD for his ulcer treatment this past Tuesday. He reports he goes every 4 months to get it treated. UBW: 124 lbs which he stated he weighed a month ago. He states they did a bedscale yesterday which was 105 lbs. Current weight today is 110 lbs via bedscale. Patient consumed 1/2 toast and cheerios for breakfast today. Typically he only eats 2x/day and drinks a fairlife protein drink. Encouraged patient to try to consume additional snacks and to turn the fairlife protein drink into a shake with additional calories like peanut butter. He states he thinks ensue tastes gritty but was agreeable to ensure clear.     Current Diet: Adult diet Cardiac; 70 gm fat; 2 - 3 grams Sodium    Nutrition Related Findings:   Oral Symptoms: Teeth: Dentures upper, Dentures lower   GI symptoms: no GI issues at this time.   BM: Last BM Date: 01/16/25  Food allergies: NKFA.   Meds/Labs reviewed.  aspirin, 81 mg, oral, Daily  metoprolol tartrate, 25 mg, oral, BID  pantoprazole, 40 mg, oral, BID AC  perflutren lipid microspheres, 0.5-10 mL of dilution, intravenous, Once in imaging  perflutren protein A microsphere, 0.5 mL, intravenous, Once in imaging  sulfur hexafluoride microsphr, 2 mL, intravenous, Once in imaging       sodium chloride 0.9%, 100 mL/hr, Last Rate: 100 mL/hr (01/17/25 1114)         Nutrition Significant  "Labs:  Results from last 7 days   Lab Units 01/17/25  0403 01/16/25  2114   GLUCOSE mg/dL 114* 146*   SODIUM mmol/L 138 136   POTASSIUM mmol/L 3.6 5.1   CHLORIDE mmol/L 102 99   CO2 mmol/L 28 30   BUN mg/dL 30* 31*   CREATININE mg/dL 0.64 0.73   EGFR mL/min/1.73m*2 >90 89   CALCIUM mg/dL 9.6 10.4*   MAGNESIUM mg/dL 1.95  --      Lab Results   Component Value Date    HGBA1C 5.7 (H) 01/17/2025           Anthropometrics:  Height: 162.6 cm (5' 4.02\")   Weight: 47.8 kg (105 lb 4.8 oz)   BMI (Calculated): 18.07             Weight History:   Wt Readings from Last 10 Encounters:   01/17/25 47.8 kg (105 lb 4.8 oz)   08/10/24 54.4 kg (120 lb)      Weight Change %:                Nutrition Focused Physical Exam Findings:  Subcutaneous Fat Loss:   Orbital Fat Pads: Mild-Moderate (slight dark circles and slight hollowing)  Buccal Fat Pads: Severe (hollow, sunken and narrow face)  Triceps: Mild-Moderate (less than ample fat tissue)  Muscle Wasting:  Temporalis: Severe (hollowed scooping depression)  Pectoralis (Clavicular Region): Severe (protruding prominent clavicle)  Edema:  Edema: none  Physical Findings:  Skin: Negative    Estimated Needs:   Total Energy Estimated Needs (kCal):  (9947-9632)  Method for Estimating Needs: 30-35 kcal, CBW 50 kg  Total Protein Estimated Needs (g): 75 g  Method for Estimating Needs: 1.5 g/kg CBW 50 kg  Total Fluid Estimated Needs (mL):  (other)  Method for Estimating Needs: 1 mL/kcal or per MD        Nutrition Diagnosis   Nutrition Diagnosis:  Malnutrition Diagnosis  Patient has Malnutrition Diagnosis: Yes  Diagnosis Status: New  Malnutrition Diagnosis: Severe malnutrition related to chronic disease or condition  As Evidenced by: severe muscle/fat loss , <75% estimated needs x 1 month, >5% weight loss x 1 month    Nutrition Diagnosis  Patient has Nutrition Diagnosis: Yes  Diagnosis Status (1): New  Nutrition Diagnosis 1: Inadequate energy intake  Related to (1): decreased ability to consume " sufficient intakes  As Evidenced by (1): significant weight loss, severe muscle and fat losses, low BMI       Nutrition Interventions/Recommendations   Nutrition Interventions and Recommendations:        Nutrition Prescription:  Individualized Nutrition Prescription Provided for : Diet, Supplements        Nutrition Interventions:   Food and/or Nutrient Delivery Interventions  Interventions: Meals and snacks  Meals and Snacks: General healthful diet  Goal: changed diet to Regular, consume >50%         Nutrition Education:   Education Documentation  No documentation found.      Nutrition Counseling  Counseling Theoretical Approach: Other (Comment)  Goal: reviewed diet, ONS       Nutrition Monitoring and Evaluation   Monitoring/Evaluation:   Food/Nutrient Related History Monitoring  Monitoring and Evaluation Plan: Energy intake  Energy Intake: Estimated energy intake  Criteria: meet >75%    Body Composition/Growth/Weight History  Monitoring and Evaluation Plan: Weight  Weight: Weight change  Criteria: stable    Biochemical Data, Medical Tests and Procedures  Monitoring and Evaluation Plan: Electrolyte/renal panel  Electrolyte and Renal Panel: Potassium, Phosphorus, Magnesium, Sodium  Criteria: wnl    Nutrition Focused Physical Findings  Monitoring and Evaluation Plan: Skin  Skin: Other (Comment)  Other: maintain skin integrity    Other: maintain skin integrity       Time Spent/Follow-up Reminder:   Follow Up  Time Spent (min): 45 minutes  Last Date of Nutrition Visit: 01/17/25  Nutrition Follow-Up Needed?: Dietitian to reassess per policy  Follow up Comment: 1/21-22

## 2025-01-17 NOTE — H&P
Chief Complaint  Palpitations, feeling not right    History Of Present Illness  Maksim Palmer is a 84 y.o. male with a history of aortic valve stenosis status post replacement, AV block status post pacemaker, Byrd's esophagus, hyperlipidemia, depression, osteoarthritis, coronary artery disease, anxiety disorder, irritable bowel syndrome, and hypertension. He presented to the emergency department late on 1/16 noting feeling poorly at home since he had EGD for his Byrd's esophagus on Tuesday, 1/14.  He reported the sensation of increased generalized weakness.  He reported normally taking metoprolol twice a day at home.  He was late on his morning dose the day prior to presentation but did take it around 2 PM.  He noted his blood pressure and heart rate were both elevated at home, with heart rates in the 110s.  He felt palpitations on the day of presentation, both with exertion and at rest.  Due to these symptoms and findings, he sought medical attention in the ED as he was worried he might have a stroke.  He denied associated chest pain, shortness of breath, fevers, or chills.  By the time he arrived to the emergency department, he reported his symptoms had resolved.  He denied nausea, vomiting, abdominal pain, or changes in bowel movements.  Vital signs on arrival to the emergency department were notable for mild hypertension that improved without specific intervention.  Other readings were unremarkable.    He did not bring a list of his home medication doses.  He noted he only takes twice a day metoprolol, twice a day omeprazole, and once a day aspirin.    Laboratory evaluation in the emergency department was notable for troponins 42-40, , glucose 146, calcium 10.4, hemoglobin 17.7, and white blood cell count 12.4.  Creatinine, LFTs, and platelet count were unremarkable.  Chest x-ray showed no focal consolidation.  There was blunting of the costophrenic angle on the left, nonspecific.  No acute process  was seen otherwise.  EKG in the emergency department showed a paced rhythm but was otherwise unrevealing.  Therapeutic interventions in the emergency department included aspirin 324 mg p.o. x 1.  Patient was admitted for further evaluation and treatment         Past Medical History  Includes history of aortic valve stenosis status post replacement, AV block status post pacemaker, Byrd's esophagus, hyperlipidemia, depression, osteoarthritis, coronary artery disease, anxiety disorder, irritable bowel syndrome, and hypertension    Surgical History  Aortic valve replacement, pacemaker insertion     Social History  He reports that he has never smoked. He has never used smokeless tobacco. He reports that he does not drink alcohol and does not use drugs.    Family History  Reviewed and not pertinent to current presentation     Allergies  Heparin, Codeine, and Penicillin    Review of Systems   Constitutional:  Positive for fatigue. Negative for chills and fever.   HENT:  Negative for postnasal drip and sore throat.    Eyes:  Negative for pain and visual disturbance.   Respiratory:  Negative for cough and shortness of breath.    Cardiovascular:  Positive for palpitations. Negative for chest pain and leg swelling.   Gastrointestinal:  Negative for abdominal pain, diarrhea, nausea and vomiting.   Genitourinary:  Negative for dysuria and hematuria.   Musculoskeletal:  Negative for arthralgias and back pain.   Skin:  Negative for rash and wound.   Neurological:  Positive for weakness (Generalized). Negative for dizziness and headaches.   Psychiatric/Behavioral:  Negative for dysphoric mood. The patient is not nervous/anxious.         Physical Exam  Vitals and nursing note reviewed.   Constitutional:       General: He is not in acute distress.     Appearance: He is underweight. He is ill-appearing.   HENT:      Head: Normocephalic and atraumatic.      Right Ear: External ear normal.      Left Ear: External ear normal.       Nose: Nose normal. No rhinorrhea.      Mouth/Throat:      Mouth: Mucous membranes are moist.      Pharynx: Oropharynx is clear. No oropharyngeal exudate.   Eyes:      General: No scleral icterus.        Right eye: No discharge.         Left eye: No discharge.      Conjunctiva/sclera: Conjunctivae normal.   Cardiovascular:      Rate and Rhythm: Normal rate and regular rhythm.      Pulses: Normal pulses.      Heart sounds: Normal heart sounds. No murmur heard.  Pulmonary:      Effort: Pulmonary effort is normal. No respiratory distress.      Breath sounds: Normal breath sounds. No wheezing or rales.   Abdominal:      General: Abdomen is flat. There is no distension.      Palpations: Abdomen is soft.      Tenderness: There is no abdominal tenderness.   Musculoskeletal:         General: No tenderness.      Right lower leg: No edema.      Left lower leg: No edema.   Skin:     General: Skin is warm and dry.      Capillary Refill: Capillary refill takes less than 2 seconds.      Findings: No rash.   Neurological:      General: No focal deficit present.      Mental Status: He is alert and oriented to person, place, and time. Mental status is at baseline.   Psychiatric:         Mood and Affect: Mood normal.         Behavior: Behavior normal.         Thought Content: Thought content normal.         Judgment: Judgment normal.          Last Recorded Vitals  /81 (BP Location: Right arm, Patient Position: Lying)   Pulse 83   Temp 36.6 °C (97.8 °F) (Temporal)   Resp 18   Wt 47.8 kg (105 lb 4.8 oz)   SpO2 95%     Relevant Results        Results for orders placed or performed during the hospital encounter of 01/16/25 (from the past 24 hours)   CBC and Auto Differential   Result Value Ref Range    WBC 12.4 (H) 4.4 - 11.3 x10*3/uL    nRBC 0.0 0.0 - 0.0 /100 WBCs    RBC 5.59 4.50 - 5.90 x10*6/uL    Hemoglobin 17.7 (H) 13.5 - 17.5 g/dL    Hematocrit 52.6 (H) 41.0 - 52.0 %    MCV 94 80 - 100 fL    MCH 31.7 26.0 - 34.0 pg     MCHC 33.7 32.0 - 36.0 g/dL    RDW 12.4 11.5 - 14.5 %    Platelets 186 150 - 450 x10*3/uL    Neutrophils % 86.8 40.0 - 80.0 %    Immature Granulocytes %, Automated 0.4 0.0 - 0.9 %    Lymphocytes % 9.4 13.0 - 44.0 %    Monocytes % 3.2 2.0 - 10.0 %    Eosinophils % 0.0 0.0 - 6.0 %    Basophils % 0.2 0.0 - 2.0 %    Neutrophils Absolute 10.74 (H) 1.60 - 5.50 x10*3/uL    Immature Granulocytes Absolute, Automated 0.05 0.00 - 0.50 x10*3/uL    Lymphocytes Absolute 1.16 0.80 - 3.00 x10*3/uL    Monocytes Absolute 0.39 0.05 - 0.80 x10*3/uL    Eosinophils Absolute 0.00 0.00 - 0.40 x10*3/uL    Basophils Absolute 0.02 0.00 - 0.10 x10*3/uL   Comprehensive Metabolic Panel   Result Value Ref Range    Glucose 146 (H) 74 - 99 mg/dL    Sodium 136 136 - 145 mmol/L    Potassium 5.1 3.5 - 5.3 mmol/L    Chloride 99 98 - 107 mmol/L    Bicarbonate 30 21 - 32 mmol/L    Anion Gap 12 10 - 20 mmol/L    Urea Nitrogen 31 (H) 6 - 23 mg/dL    Creatinine 0.73 0.50 - 1.30 mg/dL    eGFR 89 >60 mL/min/1.73m*2    Calcium 10.4 (H) 8.6 - 10.3 mg/dL    Albumin 4.3 3.4 - 5.0 g/dL    Alkaline Phosphatase 86 33 - 136 U/L    Total Protein 8.5 (H) 6.4 - 8.2 g/dL    AST 34 9 - 39 U/L    Bilirubin, Total 0.5 0.0 - 1.2 mg/dL    ALT 18 10 - 52 U/L   B-Type Natriuretic Peptide   Result Value Ref Range     (H) 0 - 99 pg/mL   Troponin I, High Sensitivity, Initial   Result Value Ref Range    Troponin I, High Sensitivity 42 (H) 0 - 20 ng/L   Troponin, High Sensitivity, 1 Hour   Result Value Ref Range    Troponin I, High Sensitivity 40 (H) 0 - 20 ng/L         XR chest 2 views    Result Date: 1/16/2025  STUDY: Chest Radiographs;  01/16/2025 10:16 PM INDICATION: Palpitation, evaluate for free air, status post EGD Tuesday. COMPARISON: XR chest 08/10/2024. ACCESSION NUMBER(S): [id number] ORDERING CLINICIAN: BUSHRA ANDERSON TECHNIQUE:  Frontal and lateral chest. FINDINGS: CARDIOMEDIASTINAL SILHOUETTE: Cardiomediastinal silhouette is normal in size and configuration.  Pacer leads noted within the heart unchanged in position.  No definite kink or discontinuity.  LUNGS: The lungs stable strict no definite focal pulmonary consolidation. There is blunting of the left costophrenic angle laterally which could represent chronic pleural thickening/fibrosis versus a small effusion.  ABDOMEN: No remarkable upper abdominal findings.  BONES: No acute osseous changes.  Median sternotomy wires are visualized.    1.  No focal pulmonary consolidation. 2.  Blunting of the costophrenic angle which may represent chronic pleural thickening/fibrosis versus a small effusion. 3.  No definite pneumothorax or pneumomediastinum.. Signed by Morro Andres MD        Assessment/Plan     Palpitations  -Unclear etiology, patient not clearly able to describe symptoms in detail  -Monitor on telemetry for arrhythmia  -Cardiology consult for pacemaker evaluation and risk stratification  -Check TSH with reflex, magnesium levels  -Patient does appear mildly dehydrated, will give 1000 mL of 0.9% normal saline at 100 mL/h over 10 hours then reevaluate  -Check echocardiogram    Elevated troponin  -Adynamic in nature, unlikely to be representative of ACS  -Recheck with morning labs to ensure no dramatic rise  -Cardiology evaluation    Erythrocytosis  -Check carboxyhemoglobin and EPO levels  -Gently hydrate as noted above    Hyperglycemia  -A1c to screen for diabetes    Hypercalcemia  -Mild, check ionized calcium and PTH  -Hydrate as noted    Leukocytosis  -Unclear etiology no obvious evidence of infection  -Check UA with reflex  -Trend with hydration           Del Solano MD

## 2025-01-17 NOTE — CONSULTS
"Inpatient consult to Cardiology  Consult performed by: Gwyn Puga MD  Consult ordered by: Del Solano MD  Reason for consult: elevated troponin, palpitation  Assessment/Recommendations: See full note         History Of Present Illness:    Patient is a 84-year-old male with history significant for Byrd's esophagus status post recent EGD on Tuesday presented to the emergency department with 1 day of elevated heart rate and elevated blood pressure. Patient has PMHx of aortic valve stenosis.  Patient reports that he has had recent EGD and has noticed his blood pressure was elevated at home he also noted his heart rate to be elevated which is why he decided to come to hospital.  Patient reported that his heart rate was in 110s and his blood pressure was 140/100.  He was concerned that bottom number is very high and he may have a stroke.  He denied any strokelike symptoms.  He denied any chest pain.  He denied any syncope.  He denied any shortness of breath.  Patient reports history of pacemaker and valve replacement.  He does not recall any other cardiac history at this time.  [facility]     On arrival to hospital his cardiac enzymes were flatly elevated.  Cardiology was consulted for elevated heart rate and abnormal cardiac enzyme.      EKG showed paced rhythm otherwise unremarkable.        Last Recorded Vitals:  Vitals:    01/16/25 2000 01/17/25 0058 01/17/25 0233 01/17/25 0500   BP: 146/82 141/81 153/81 155/79   BP Location:   Right arm Right arm   Patient Position:   Lying Lying   Pulse: 81 77 83 81   Resp: 18 18 18 18   Temp:   36.6 °C (97.8 °F) 36.8 °C (98.2 °F)   TempSrc:   Temporal Temporal   SpO2: 97% 97% 95% 97%   Weight:   47.8 kg (105 lb 4.8 oz)    Height:   1.626 m (5' 4.02\")        Last Labs:  CBC - 1/17/2025:  4:03 AM  11.8 15.0 163    44.9      CMP - 1/17/2025:  4:03 AM  9.6 6.8 23 --- 0.4   2.7 3.6 15 71      PTT - No results in last year.  _   _ _     Troponin I, High " "Sensitivity   Date/Time Value Ref Range Status   01/17/2025 04:03 AM 46 (H) 0 - 20 ng/L Final   01/16/2025 10:18 PM 40 (H) 0 - 20 ng/L Final   01/16/2025 09:14 PM 42 (H) 0 - 20 ng/L Final     BNP   Date/Time Value Ref Range Status   01/16/2025 09:14  (H) 0 - 99 pg/mL Final      Last I/O:  I/O last 3 completed shifts:  In: 285 (6 mL/kg) [I.V.:285 (6 mL/kg)]  Out: - (0 mL/kg)   Weight: 47.8 kg     Past Cardiology Tests (Last 3 Years):  EKG:  ECG 12 Lead 01/16/2025 (Preliminary)    Echo:  No results found for this or any previous visit from the past 1095 days.    Ejection Fractions:  No results found for: \"EF\"  Cath:  No results found for this or any previous visit from the past 1095 days.    Stress Test:  No results found for this or any previous visit from the past 1095 days.    Cardiac Imaging:  No results found for this or any previous visit from the past 1095 days.      Past Medical History:  He has no past medical history on file.    Past Surgical History:  He has no past surgical history on file.      Social History:  He reports that he has never smoked. He has never used smokeless tobacco. He reports that he does not drink alcohol and does not use drugs.    Family History:  No family history on file.     Allergies:  Heparin, Codeine, and Penicillin    Inpatient Medications:  Scheduled medications   Medication Dose Route Frequency    aspirin  81 mg oral Daily    metoprolol tartrate  25 mg oral BID    pantoprazole  40 mg oral BID AC    perflutren lipid microspheres  0.5-10 mL of dilution intravenous Once in imaging    perflutren protein A microsphere  0.5 mL intravenous Once in imaging    sulfur hexafluoride microsphr  2 mL intravenous Once in imaging     PRN medications   Medication    acetaminophen    Or    acetaminophen    Or    acetaminophen    melatonin    ondansetron ODT    Or    ondansetron    polyethylene glycol     Continuous Medications   Medication Dose Last Rate    sodium chloride 0.9%  100 " mL/hr 100 mL/hr (01/17/25 0638)     Outpatient Medications:  Current Outpatient Medications   Medication Instructions    aspirin 81 mg, oral, Daily    metoprolol tartrate (Lopressor) 25 mg tablet 1 tablet, 2 times daily    omeprazole (PRILOSEC) 20 mg, 2 times daily before meals       Physical Exam:  General: Alert and Oriented, No distress, cooperative  Head: Normocephalic without obvious abnormality, atraumatic  Eyes: Conjunctiva/corneas clear, EOM's grossly intact  Neck: Supple, trachea midline, No thyroid enlargement/tenderness/nodules; No JVD  Lungs: Clear to auscultation bilaterally, no wheezes, rhonci, or rales. respirations unlabored  Chest Wall: No tenderness or deformity  Heart: Regular rhythm, normal S1/S2, 3/6 ejection systolic murmur  Abdomen: Soft, non-tender, Non-distended, bowel sounds active  Extremities: No edema, no cyanosis, no clubbing  Skin: Skin color, texture, turgor normal.  No rashes or lesions noted  Neurologic: Alert and oriented x 3, grossly moving all extremities, speech intact       Assessment/Plan   -Essential hypertension  -Tachycardia?  -History of complete heart block status post PPM s/p RV lead revision fracture.   -Aortic stenosis status post AVR   -Essential hypertension  -Hyperlipidemia      Plan:  -Patient comes to hospital due to concern for elevated blood pressure and heart rate. device interrogation was done.  No AT/A-fib or ventricular arrhythmia noted.  Outside cardiology notes were reviewed.  Patient is pacer dependent.  s/p dc pacemaker 2/2 CHB 2009, change out 12/2020 d/t MINDY, 2/2023 RV lead revision 2/2 fracture w/ CRTP device keeping old RV lead on in LV port until new RV lead matures for safety.   -Will recommend to obtain TTE, need to evaluate for valvular dysfunction given murmur.  -Troponin flat not consistent with ACS.  Probably underlying valvular heart disease versus CHF?  -Will recommend to add amlodipine 2.5 mg daily for better blood pressure  control.  -Continue metoprolol and aspirin as taking.    Cardiology will follow.    Peripheral IV 01/16/25 20 G Left;Proximal;Anterior Forearm (Active)   Site Assessment Clean;Dry;Intact 01/17/25 0734   Dressing Status Dry;Clean;Occlusive 01/17/25 0734   Number of days: 1       Code Status:  Full Code          Gwyn Puga MD

## 2025-01-17 NOTE — PROGRESS NOTES
Physical Therapy Screen                 Therapy Communication Note    Patient Name: Maksim Palmer  MRN: [id number]  Department: [facility] E  Room: [address] [place] [address]-A  Today's Date: 1/17/2025     Discipline: Physical Therapy          Comment: Patient screened in room 3328 - seen up EOB with OT, witnessed patient stand and amb 45 feet with SBA x1, no LOB. Patient has no questions/concerns for therapy at this time, appears at baseline, has no identified skilled acute needs. Will discharge from PT.

## 2025-01-17 NOTE — CARE PLAN
Problem: Pain - Adult  Goal: Verbalizes/displays adequate comfort level or baseline comfort level  Outcome: Progressing  Flowsheets (Taken 1/17/2025 9977)  Verbalizes/displays adequate comfort level or baseline comfort level:   Encourage patient to monitor pain and request assistance   Assess pain using appropriate pain scale   Administer analgesics based on type and severity of pain and evaluate response   Implement non-pharmacological measures as appropriate and evaluate response     Problem: Safety - Adult  Goal: Free from fall injury  Outcome: Progressing     Problem: Discharge Planning  Goal: Discharge to home or other facility with appropriate resources  Outcome: Progressing     Problem: Chronic Conditions and Co-morbidities  Goal: Patient's chronic conditions and co-morbidity symptoms are monitored and maintained or improved  Outcome: Progressing   The patient's goals for the shift include      The clinical goals for the shift include Pt will be free of palpitations during my shift.

## 2025-01-17 NOTE — ED PROVIDER NOTES
HPI   Chief Complaint   Patient presents with    Hypertension    Rapid Heart Rate    Weakness, Gen       HPI  HISTORY OF PRESENT ILLNESS:  Patient is a 84-year-old male with history significant for Byrd's esophagus status post recent EGD on Tuesday presented to the emergency department with 1 day of palpitations.  Patient has been feeling a heart racing sensation since yesterday.  This afternoon, it had worsened in terms of sensation.  Would not describe as chest pain.  No shortness of breath, fever, chills.  He had noted that he felt weaker and also had elevated blood pressures at home.  He currently has resolution of symptoms.  Has not had any hemoptysis, bloody bowel movements.    Past Medical History:Aortic valve stenosis status post replacement, AV block status post pacemaker, Byrd's esophagus, hyperlipidemia, hypertension  Family History: family history not pertinent to presenting problem or chief complaint  Social History: Patient not currently refractory.  She was    __________________________________________________________  PHYSICAL EXAM:    Appearance: Alert, oriented , cooperative   Skin: Intact,  dry skin, no lesions, rash, petechiae or purpura.   Eyes: PERRLA, EOMs intact,  Conjunctiva pink with no redness or exudates.    HENT: Normocephalic, atraumatic. Nares patent   Neck: Supple. Trachea at midline.   Pulmonary: Lung sounds are clear bilaterally.  There is no rales, rhonchi, or wheezing.  Cardiac: Regular rate and rhythm, no rubs, murmurs, or gallops. No JVD,   Abdomen: Abdomen is soft, nontender, and nondistended.  No palpable organomegaly.  No rebound or guarding.  No CVA tenderness. Nonsurgical abdomen  Genitourinary: Exam deferred.  Musculoskeletal: no edema, pain, cyanosis, or deformity in extremities. Pulses full and equal.   Neurological:  Cranial nerves are grossly intact, grossly normal sensation, no weakness, no focal findings  identified.    __________________________________________________________  MEDICAL DECISION MAKING:    Patient was seen and examined. Differential diagnosis for Palpitations includes ACS, abnormal rhythm, atrial fibrillation, EGD complication.  Patient currently has resolution of symptoms but did have palpitations for the last day into this afternoon.  The patient currently does not have chest pain, shortness of breath.  This seems to be not consistent with an EGD.  The patient will have a chest x-ray to evaluate for pneumomediastinum in addition to cardiac labs.  Patient cardiac labs showed a troponin elevation of 42 with second that down trended.  Otherwise, he had a nonspecific white count of 12.4.  Chest x-ray did not show evidence of pneumomediastinum.  There may have been signs of small effusion.  I did independently reviewed the chest x-ray not noted any obvious pneumothorax, opacity, or pneumomediastinum.  I did review interrogation and did not find any obvious abnormality.  Given that the patient had palpitations with his risk factors for cardiac disease, heart score of 4, recommended observation stay.  Patient agreeable with this.  They was contacted.  No beds available.  Appropriate to admit here.  Case discussed with [facility] and patient was admitted.      Chronic Medical Conditions Significantly Affecting Care: Aortic valve stenosis status post replacement, AV block status post pacemaker, Byrd's esophagus, hyperlipidemia, hypertension    External Records Reviewed: I reviewed recent and relevant outside records including: Records from August 10, 2024 from the [facility]  Emergency Medicine      Patient History   No past medical history on file.  No past surgical history on file.  No family history on file.  Social History     Tobacco Use    Smoking status: Never    Smokeless tobacco: Never   Vaping Use    Vaping status: Never Used   Substance Use Topics    Alcohol use: Never    Drug use: Never        Physical Exam   ED Triage Vitals [01/16/25 1857]   Temperature Heart Rate Respirations BP   36.7 °C (98.1 °F) 91 16 159/64      Pulse Ox Temp Source Heart Rate Source Patient Position   97 % Temporal Monitor --      BP Location FiO2 (%)     -- --       Physical Exam      ED Course & [facility]   ED Course as of 01/17/25 0030   u Jan 16, 2025   2151 Troponin I, High Sensitivity(!): 42 [WJ]   2207 Patient twelve-lead EKG interpreted by myself shows atrially sensed ventricularly paced rhythm, rate 76, left axis deviation first-degree AV block, prolonged QT with QTc of 499 ms, this was compared with his prior EKG that was obtained on October 19, 2020 where unfortunately there was no paced rhythm at that time [WJ]   1698 Updated patient.  Was going to be admitted/transferred to [place] [facility].  Will reach out to the PA. [WJ]      ED Course User Index  [WJ] Drew Nelson DO         Diagnoses as of 01/17/25 0030   Palpitations                 No data recorded     Aleida Coma Scale Score: 15 (01/16/25 1857 : Scarlet Montesinos RN)                           Medical Decision Making      Procedure  Procedures     Drew Nelson DO  01/17/25 0110

## 2025-01-17 NOTE — PROGRESS NOTES
Maksim Palmer is a 84 y.o. male on day 0 of admission presenting with Palpitations.      Subjective   Maksim Palmer is a 84 y.o. male with a history of aortic valve stenosis status post replacement, AV block status post pacemaker, Byrd's esophagus, hyperlipidemia, depression, osteoarthritis, coronary artery disease, anxiety disorder, irritable bowel syndrome, and hypertension. He presented to the emergency department late on 1/16 noting feeling poorly at home since he had EGD for his Byrd's esophagus on Tuesday, 1/14.  He reported the sensation of increased generalized weakness.  He reported normally taking metoprolol twice a day at home.  He was late on his morning dose the day prior to presentation but did take it around 2 PM.  He noted his blood pressure and heart rate were both elevated at home, with heart rates in the 110s.  He felt palpitations on the day of presentation, both with exertion and at rest.  Due to these symptoms and findings, he sought medical attention in the ED as he was worried he might have a stroke.  He denied associated chest pain, shortness of breath, fevers, or chills.  By the time he arrived to the emergency department, he reported his symptoms had resolved.  He denied nausea, vomiting, abdominal pain, or changes in bowel movements.  Vital signs on arrival to the emergency department were notable for mild hypertension that improved without specific intervention.  Other readings were unremarkable.     He did not bring a list of his home medication doses.  He noted he only takes twice a day metoprolol, twice a day omeprazole, and once a day aspirin.     Laboratory evaluation in the emergency department was notable for troponins 42-40, , glucose 146, calcium 10.4, hemoglobin 17.7, and white blood cell count 12.4.  Creatinine, LFTs, and platelet count were unremarkable.  Chest x-ray showed no focal consolidation.  There was blunting of the costophrenic angle on the left,  nonspecific.  No acute process was seen otherwise.  EKG in the emergency department showed a paced rhythm but was otherwise unrevealing.  Therapeutic interventions in the emergency department included aspirin 324 mg p.o. x 1.  Patient was admitted for further evaluation and treatment.  1/17/2025: Patient was seen and examined.  Seen by cardiologist who recommended echocardiogram which is still pending.  Added p.o. amlodipine for better blood pressure control per cardiologist recommendation.       Objective     Last Recorded Vitals  /71 (BP Location: Right arm, Patient Position: Lying)   Pulse 66   Temp 36.9 °C (98.5 °F) (Temporal)   Resp 16   Wt 47.8 kg (105 lb 4.8 oz)   SpO2 96%   Intake/Output last 3 Shifts:    Intake/Output Summary (Last 24 hours) at 1/17/2025 1428  Last data filed at 1/17/2025 1326  Gross per 24 hour   Intake 1115 ml   Output 200 ml   Net 915 ml       Admission Weight  Weight: 54.4 kg (120 lb) (01/16/25 1857)    Daily Weight  01/17/25 : 47.8 kg (105 lb 4.8 oz)    Image Results  ECG 12 Lead  Atrial-sensed ventricular-paced rhythm  No further analysis attempted due to paced rhythm      Physical Exam  Vitals:    01/17/25 1310   BP: 134/71   Pulse: 66   Resp: 16   Temp: 36.9 °C (98.5 °F)   SpO2: 96%     Constitutional:       General: He is not in acute distress.     Appearance: He is underweight. He is ill-appearing.   HENT:      Head: Normocephalic and atraumatic.      Right Ear: External ear normal.      Left Ear: External ear normal.      Nose: Nose normal. No rhinorrhea.      Mouth/Throat:      Mouth: Mucous membranes are moist.      Pharynx: Oropharynx is clear. No oropharyngeal exudate.   Eyes:      General: No scleral icterus.        Right eye: No discharge.         Left eye: No discharge.      Conjunctiva/sclera: Conjunctivae normal.   Cardiovascular:      Rate and Rhythm: Normal rate and regular rhythm.      Pulses: Normal pulses.      Heart sounds: Normal heart sounds. No murmur  heard.  Pulmonary:      Effort: Pulmonary effort is normal. No respiratory distress.      Breath sounds: Normal breath sounds. No wheezing or rales.   Abdominal:      General: Abdomen is flat. There is no distension.      Palpations: Abdomen is soft.      Tenderness: There is no abdominal tenderness.   Musculoskeletal:         General: No tenderness.      Right lower leg: No edema.      Left lower leg: No edema.   Skin:     General: Skin is warm and dry.      Capillary Refill: Capillary refill takes less than 2 seconds.      Findings: No rash.   Neurological:      General: No focal deficit present.      Mental Status: He is alert and oriented to person, place, and time. Mental status is at baseline.   Psychiatric:         Mood and Affect: Mood normal.         Behavior: Behavior normal.         Thought Content: Thought content normal.         Judgment: Judgment normal.         Relevant Results               Assessment/Plan                  Assessment & Plan  Palpitations    Palpitations  -Unclear etiology, patient not clearly able to describe symptoms in detail  -Monitor on telemetry for arrhythmia  -Cardiology consult for pacemaker evaluation and risk stratification  -Check TSH with reflex, magnesium levels  -Patient does appear mildly dehydrated, will give 1000 mL of 0.9% normal saline at 100 mL/h over 10 hours then reevaluate  -Check echocardiogram     Elevated troponin  -Adynamic in nature, unlikely to be representative of ACS  -Recheck with morning labs to ensure no dramatic rise  -Cardiology evaluation     Erythrocytosis  -Check carboxyhemoglobin and EPO levels  -Gently hydrate as noted above     Hyperglycemia  -A1c to screen for diabetes     Hypercalcemia  -Mild, check ionized calcium and PTH  -Hydrate as noted     Leukocytosis  -Unclear etiology no obvious evidence of infection  -Check UA with reflex  -Trend with hydration      Malnutrition Diagnosis Status: New  Malnutrition Diagnosis: Severe malnutrition  related to chronic disease or condition  As Evidenced by: severe muscle/fat loss , <75% estimated needs x 1 month, >5% weight loss x 1 month  I agree with the dietitian's malnutrition diagnosis.         Spent 35 minutes in the follow-up management of this patient today    Beata Salvador MD

## 2025-01-17 NOTE — PROGRESS NOTES
01/17/25 1311   Discharge Planning   Living Arrangements Alone   Support Systems Children;Family members   Assistance Needed independent at baseline   Type of Residence Private residence   Home or Post Acute Services None   Expected Discharge Disposition Home   Does the patient need discharge transport arranged? No   Intensity of Service   Intensity of Service 0-30 min     Met with patient at bedside, introduced self and role as RN TCC. Patient lives at home alone. He has children nearby that are able to help and check on him, daughter assists with appointments, and transportation. Patient follows with the [facility]. He is able to manage his medications at baseline. He is able to prepare his own meals, cleaning, etc. He is admitted for palpitations, he is pending Echo, Cardiology on consult. Patient plans to return home when medically ready. TCC to follow.

## 2025-01-17 NOTE — NURSING NOTE
Report called to Tia at facility. Patients [facility] are out and transport is running late, supposed to be here around 1700. Patient resting comfortably.

## 2025-01-17 NOTE — PROGRESS NOTES
Occupational Therapy  Evaluation    Patient Name: Maksim Palmer  MRN: [id number]  Today's Date: 1/17/2025  Time Calculation  Start Time: 1450  Stop Time: 1459  Time Calculation (min): 9 min    Current Problem:   1. Palpitations        OT order: OT eval and treat   Referred by: [place]  Reason for referral: ADLs, safety assessment  Past medical history related to rehab:  has no past medical history on file.     Precautions:   Hearing/Visual Limitations: intact  Medical Precautions: Fall precautions    ASSESSMENT  OT Assessment: OT eval completed. The patient is functioning near baseline for ADLs and functional mobility. no further skilled OT needs. DC..     Prognosis:    Barriers to discharge home: No anticipated barriers           Tolerance:      PLAN  Frequency: OT eval only  Treatment Interventions:    Discharge Recommendations: No OT needed after discharge, No further acute OT  OT OK to discharge: Yes    GENERAL VISIT INFORMATION   Start of session communication: Bedside nurse, Care Coordinator  End of session communication: Bedside nurse  Family/caregiver present: No  Caregiver feedback:    Co-Treatment:    Reason for co-treatment:     Position Pt Received:  Bed, 3 rail up, Alarm off, not on at start of session  End of session position:      SUBJECTIVE  Home Living:  Type of Home: Mobile home  Lives With: Alone  Home Adaptive Equipment: None  Home Layout: One level  Home Access: Stairs to enter with rails  Entrance Stairs-Number of Steps: 4-5     Prior Level of Function:  ADL Assistance: Independent  Homemaking Assistance: Independent  Ambulatory Assistance: Independent  Vocational: Retired  Leisure: likes to work on cars, 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  Prior Function Comments: +drives      Pain:  Assessment: 0-10  Score: 0 - No pain      OBJECTIVE       Cognition:  Overall Cognitive Status: Within Functional Limits (oriented x4)             Current ADL function:   EATING:  Independent     GROOMING:  Independent     BATHING: Minimal     UB DRESSING: Stand by     LB DRESSING: Stand by     TOILETING: Stand by    ADL comments:       Activity Tolerance:  Endurance: Endurance does not limit participation in activity    Bed Mobility/Transfers:   Bed Mobility  Bed Mobility: Yes  Bed Mobility 1  Bed Mobility 1: Supine to sitting  Level of Assistance 1: Independent  Transfers  Transfer:  (sit<>stand SBAX1)    Ambulation/Gait Training:  Functional Mobility  Functional Mobility Performed:  (pt performed functional mobility around the room with SBAx1. no AD. no LOB)    Sitting Balance:  Static Sitting Balance  Static Sitting-Level of Assistance: Independent  Dynamic Sitting Balance  Dynamic Sitting-Level of Assistance: Independent    Standing Balance:  Static Standing Balance  Static Standing-Level of Assistance: Distant supervision  Dynamic Standing Balance  Dynamic Standing-Level of Assistance: Close supervision    Vision: Vision - Basic Assessment  Current Vision: No visual deficits   and      Sensation:  Light Touch: No apparent deficits    Strength:  Strength Comments: BUE WFL    Perception:  Inattention/Neglect: Appears intact    Coordination:  Movements are Fluid and Coordinated: Yes     Hand Function:  Hand Function  Gross Grasp: Functional    Extremities: RUE   RUE : Within Functional Limits and LUE   LUE: Within Functional Limits    Outcome Measures: [facility] Daily Activity   Putting on and taking off regular lower body clothing: A little  Bathing (including washing, rinsing, drying): A little  Putting on and taking off regular upper body clothing: A little  Toileting, which includes using toilet, bedpan or urinal: A little  Taking care of personal grooming such as brushing teeth: None   Eating Meals: None   Daily Activity - Total Score: 20    EDUCATION:     Education Documentation  ADL Training, taught by Gwen Decker OT at 1/17/2025  3:19 PM.  Learner: Patient  Readiness: Eager  Method: Explanation  Response:  Verbalizes Understanding    Education Comments  No comments found.

## 2025-01-17 NOTE — CARE PLAN
Problem: Pain - Adult  Goal: Verbalizes/displays adequate comfort level or baseline comfort level  Outcome: Progressing     Problem: Safety - Adult  Goal: Free from fall injury  Outcome: Progressing     Problem: Discharge Planning  Goal: Discharge to home or other facility with appropriate resources  Outcome: Progressing     Problem: Chronic Conditions and Co-morbidities  Goal: Patient's chronic conditions and co-morbidity symptoms are monitored and maintained or improved  Outcome: Progressing   The patient's goals for the shift include      The clinical goals for the shift include pt will be free of palpitations this shift.

## 2025-01-17 NOTE — NURSING NOTE
No need to repeat interrogation. Interrogation report sent to Snow Chen at [phone] from yesterday.

## 2025-01-18 ENCOUNTER — PHARMACY VISIT (OUTPATIENT)
Dept: PHARMACY | Facility: CLINIC | Age: 85
End: 2025-01-18
Payer: COMMERCIAL

## 2025-01-18 VITALS
SYSTOLIC BLOOD PRESSURE: 111 MMHG | WEIGHT: 105.3 LBS | BODY MASS INDEX: 17.98 KG/M2 | TEMPERATURE: 97.3 F | HEART RATE: 72 BPM | DIASTOLIC BLOOD PRESSURE: 52 MMHG | RESPIRATION RATE: 16 BRPM | OXYGEN SATURATION: 95 % | HEIGHT: 64 IN

## 2025-01-18 PROBLEM — R00.2 PALPITATIONS: Status: RESOLVED | Noted: 2025-01-17 | Resolved: 2025-01-18

## 2025-01-18 LAB
ALBUMIN SERPL BCP-MCNC: 3.3 G/DL (ref 3.4–5)
ALP SERPL-CCNC: 63 U/L (ref 33–136)
ALT SERPL W P-5'-P-CCNC: 15 U/L (ref 10–52)
ANION GAP SERPL CALC-SCNC: 9 MMOL/L (ref 10–20)
AST SERPL W P-5'-P-CCNC: 24 U/L (ref 9–39)
BASOPHILS # BLD AUTO: 0.02 X10*3/UL (ref 0–0.1)
BASOPHILS NFR BLD AUTO: 0.2 %
BILIRUB SERPL-MCNC: 0.4 MG/DL (ref 0–1.2)
BUN SERPL-MCNC: 20 MG/DL (ref 6–23)
CALCIUM SERPL-MCNC: 8.9 MG/DL (ref 8.6–10.3)
CHLORIDE SERPL-SCNC: 105 MMOL/L (ref 98–107)
CO2 SERPL-SCNC: 28 MMOL/L (ref 21–32)
CREAT SERPL-MCNC: 0.67 MG/DL (ref 0.5–1.3)
EGFRCR SERPLBLD CKD-EPI 2021: >90 ML/MIN/1.73M*2
EOSINOPHIL # BLD AUTO: 0.03 X10*3/UL (ref 0–0.4)
EOSINOPHIL NFR BLD AUTO: 0.4 %
EPO SERPL-ACNC: 4 MU/ML (ref 4–27)
ERYTHROCYTE [DISTWIDTH] IN BLOOD BY AUTOMATED COUNT: 12.6 % (ref 11.5–14.5)
GLUCOSE SERPL-MCNC: 118 MG/DL (ref 74–99)
HCT VFR BLD AUTO: 42.8 % (ref 41–52)
HGB BLD-MCNC: 14.1 G/DL (ref 13.5–17.5)
HOLD SPECIMEN: NORMAL
IMM GRANULOCYTES # BLD AUTO: 0.03 X10*3/UL (ref 0–0.5)
IMM GRANULOCYTES NFR BLD AUTO: 0.4 % (ref 0–0.9)
LYMPHOCYTES # BLD AUTO: 2.21 X10*3/UL (ref 0.8–3)
LYMPHOCYTES NFR BLD AUTO: 26.7 %
MAGNESIUM SERPL-MCNC: 1.9 MG/DL (ref 1.6–2.4)
MCH RBC QN AUTO: 30.3 PG (ref 26–34)
MCHC RBC AUTO-ENTMCNC: 32.9 G/DL (ref 32–36)
MCV RBC AUTO: 92 FL (ref 80–100)
MONOCYTES # BLD AUTO: 0.7 X10*3/UL (ref 0.05–0.8)
MONOCYTES NFR BLD AUTO: 8.5 %
NEUTROPHILS # BLD AUTO: 5.29 X10*3/UL (ref 1.6–5.5)
NEUTROPHILS NFR BLD AUTO: 63.8 %
NRBC BLD-RTO: 0 /100 WBCS (ref 0–0)
PLATELET # BLD AUTO: 142 X10*3/UL (ref 150–450)
POTASSIUM SERPL-SCNC: 3.8 MMOL/L (ref 3.5–5.3)
PROT SERPL-MCNC: 6 G/DL (ref 6.4–8.2)
RBC # BLD AUTO: 4.65 X10*6/UL (ref 4.5–5.9)
SODIUM SERPL-SCNC: 138 MMOL/L (ref 136–145)
WBC # BLD AUTO: 8.3 X10*3/UL (ref 4.4–11.3)

## 2025-01-18 PROCEDURE — 99238 HOSP IP/OBS DSCHRG MGMT 30/<: CPT | Performed by: INTERNAL MEDICINE

## 2025-01-18 PROCEDURE — 85025 COMPLETE CBC W/AUTO DIFF WBC: CPT | Performed by: INTERNAL MEDICINE

## 2025-01-18 PROCEDURE — 80053 COMPREHEN METABOLIC PANEL: CPT | Performed by: INTERNAL MEDICINE

## 2025-01-18 PROCEDURE — G0378 HOSPITAL OBSERVATION PER HR: HCPCS

## 2025-01-18 PROCEDURE — 83735 ASSAY OF MAGNESIUM: CPT | Performed by: INTERNAL MEDICINE

## 2025-01-18 PROCEDURE — 2500000001 HC RX 250 WO HCPCS SELF ADMINISTERED DRUGS (ALT 637 FOR MEDICARE OP): Performed by: INTERNAL MEDICINE

## 2025-01-18 PROCEDURE — RXMED WILLOW AMBULATORY MEDICATION CHARGE

## 2025-01-18 PROCEDURE — 99231 SBSQ HOSP IP/OBS SF/LOW 25: CPT | Performed by: NURSE PRACTITIONER

## 2025-01-18 PROCEDURE — 36415 COLL VENOUS BLD VENIPUNCTURE: CPT | Performed by: INTERNAL MEDICINE

## 2025-01-18 RX ORDER — AMLODIPINE BESYLATE 2.5 MG/1
2.5 TABLET ORAL DAILY
Qty: 30 TABLET | Refills: 0 | Status: SHIPPED | OUTPATIENT
Start: 2025-01-18 | End: 2025-02-17

## 2025-01-18 RX ADMIN — PANTOPRAZOLE SODIUM 40 MG: 40 TABLET, DELAYED RELEASE ORAL at 07:30

## 2025-01-18 RX ADMIN — ASPIRIN 81 MG CHEWABLE TABLET 81 MG: 81 TABLET CHEWABLE at 08:44

## 2025-01-18 RX ADMIN — AMLODIPINE BESYLATE 2.5 MG: 2.5 TABLET ORAL at 08:44

## 2025-01-18 RX ADMIN — METOPROLOL TARTRATE 25 MG: 25 TABLET, FILM COATED ORAL at 08:44

## 2025-01-18 ASSESSMENT — COGNITIVE AND FUNCTIONAL STATUS - GENERAL
DAILY ACTIVITIY SCORE: 24
MOBILITY SCORE: 24

## 2025-01-18 ASSESSMENT — PAIN SCALES - GENERAL: PAINLEVEL_OUTOF10: 0 - NO PAIN

## 2025-01-18 NOTE — DISCHARGE SUMMARY
Discharge Diagnosis  Palpitations  Hyperglycemia  Leukocytosis  Elevated troponin  History of aortic valve stenosis  History of AV block  Byrd's esophagus  Hyperlipidemia  Depression  Anxiety      Issues Requiring Follow-Up      Discharge Meds     Medication List      ASK your doctor about these medications     aspirin 81 mg EC tablet   metoprolol tartrate 25 mg tablet; Commonly known as: Lopressor   omeprazole 20 mg DR capsule; Commonly known as: PriLOSEC       Test Results Pending At Discharge  Pending Labs       Order Current Status    Erythropoietin In process            Hospital Course  Maksim Palmer is a 84 y.o. male with a history of aortic valve stenosis status post replacement, AV block status post pacemaker, Byrd's esophagus, hyperlipidemia, depression, osteoarthritis, coronary artery disease, anxiety disorder, irritable bowel syndrome, and hypertension. He presented to the emergency department late on 1/16 noting feeling poorly at home since he had EGD for his Byrd's esophagus on Tuesday, 1/14.  He reported the sensation of increased generalized weakness.  He reported normally taking metoprolol twice a day at home.  He was late on his morning dose the day prior to presentation but did take it around 2 PM.  He noted his blood pressure and heart rate were both elevated at home, with heart rates in the 110s.  He felt palpitations on the day of presentation, both with exertion and at rest.  Due to these symptoms and findings, he sought medical attention in the ED as he was worried he might have a stroke.  He denied associated chest pain, shortness of breath, fevers, or chills.  By the time he arrived to the emergency department, he reported his symptoms had resolved.  He denied nausea, vomiting, abdominal pain, or changes in bowel movements.  Vital signs on arrival to the emergency department were notable for mild hypertension that improved without specific intervention.  Other readings were  unremarkable.     He did not bring a list of his home medication doses.  He noted he only takes twice a day metoprolol, twice a day omeprazole, and once a day aspirin.     Laboratory evaluation in the emergency department was notable for troponins 42-40, , glucose 146, calcium 10.4, hemoglobin 17.7, and white blood cell count 12.4.  Creatinine, LFTs, and platelet count were unremarkable.  Chest x-ray showed no focal consolidation.  There was blunting of the costophrenic angle on the left, nonspecific.  No acute process was seen otherwise.  EKG in the emergency department showed a paced rhythm but was otherwise unrevealing.  Therapeutic interventions in the emergency department included aspirin 324 mg p.o. x 1.  Patient was admitted for further evaluation and treatment.  1/17/2025: Patient was seen and examined.  Seen by cardiologist who recommended echocardiogram which is still pending.  Added p.o. amlodipine for better blood pressure control per cardiologist recommendation.  1/18: Patient was seen and examined.  He remains asymptomatic.  Blood pressure is better controlled.  Echocardiogram done showed EF of 65 to 70% with impaired diastolic filling.   CBC and BMP show no significant findings.  Consider discharge if cardiology signs off.  Addendum: Patient was discharged in stable condition to follow-up as outpatient with primary care physician within 1 week of discharge and with cardiologist as scheduled.      The discharge process took about 35 minutes.  Pertinent Physical Exam At Time of Discharge  Physical Exam  Vitals:    01/18/25 0900   BP: 122/70   Pulse: 72   Resp: 16   Temp: 36.1 °C (97 °F)   SpO2: 95%   Constitutional:       General: He is not in acute distress.     Appearance: He is underweight. He is ill-appearing.   HENT:      Head: Normocephalic and atraumatic.      Right Ear: External ear normal.      Left Ear: External ear normal.      Nose: Nose normal. No rhinorrhea.      Mouth/Throat:       Mouth: Mucous membranes are moist.      Pharynx: Oropharynx is clear. No oropharyngeal exudate.   Eyes:      General: No scleral icterus.        Right eye: No discharge.         Left eye: No discharge.      Conjunctiva/sclera: Conjunctivae normal.   Cardiovascular:      Rate and Rhythm: Normal rate and regular rhythm.      Pulses: Normal pulses.      Heart sounds: Normal heart sounds. No murmur heard.  Pulmonary:      Effort: Pulmonary effort is normal. No respiratory distress.      Breath sounds: Normal breath sounds. No wheezing or rales.   Abdominal:      General: Abdomen is flat. There is no distension.      Palpations: Abdomen is soft.      Tenderness: There is no abdominal tenderness.   Musculoskeletal:         General: No tenderness.      Right lower leg: No edema.      Left lower leg: No edema.   Skin:     General: Skin is warm and dry.      Capillary Refill: Capillary refill takes less than 2 seconds.      Findings: No rash.   Neurological:      General: No focal deficit present.      Mental Status: He is alert and oriented to person, place, and time. Mental status is at baseline.   Psychiatric:         Mood and Affect: Mood normal.         Behavior: Behavior normal.         Thought Content: Thought content normal.         Judgment: Judgment normal.        Outpatient Follow-Up  No future appointments.      Beata Salvador MD

## 2025-01-18 NOTE — CARE PLAN
The patient's goals for the shift include      The clinical goals for the shift include Patient will be free from chest palpitations by end of shift.    Over the shift, the patient remains free from chest palpitations this shift.

## 2025-01-18 NOTE — PROGRESS NOTES
Maksim Palmer is a 85 y.o. male on day 0 of admission presenting with Palpitations.      Subjective   Maksim Palmer is a 84 y.o. male with a history of aortic valve stenosis status post replacement, AV block status post pacemaker, Byrd's esophagus, hyperlipidemia, depression, osteoarthritis, coronary artery disease, anxiety disorder, irritable bowel syndrome, and hypertension. He presented to the emergency department late on 1/16 noting feeling poorly at home since he had EGD for his Byrd's esophagus on Tuesday, 1/14.  He reported the sensation of increased generalized weakness.  He reported normally taking metoprolol twice a day at home.  He was late on his morning dose the day prior to presentation but did take it around 2 PM.  He noted his blood pressure and heart rate were both elevated at home, with heart rates in the 110s.  He felt palpitations on the day of presentation, both with exertion and at rest.  Due to these symptoms and findings, he sought medical attention in the ED as he was worried he might have a stroke.  He denied associated chest pain, shortness of breath, fevers, or chills.  By the time he arrived to the emergency department, he reported his symptoms had resolved.  He denied nausea, vomiting, abdominal pain, or changes in bowel movements.  Vital signs on arrival to the emergency department were notable for mild hypertension that improved without specific intervention.  Other readings were unremarkable.     He did not bring a list of his home medication doses.  He noted he only takes twice a day metoprolol, twice a day omeprazole, and once a day aspirin.     Laboratory evaluation in the emergency department was notable for troponins 42-40, , glucose 146, calcium 10.4, hemoglobin 17.7, and white blood cell count 12.4.  Creatinine, LFTs, and platelet count were unremarkable.  Chest x-ray showed no focal consolidation.  There was blunting of the costophrenic angle on the left,  nonspecific.  No acute process was seen otherwise.  EKG in the emergency department showed a paced rhythm but was otherwise unrevealing.  Therapeutic interventions in the emergency department included aspirin 324 mg p.o. x 1.  Patient was admitted for further evaluation and treatment.  1/17/2025: Patient was seen and examined.  Seen by cardiologist who recommended echocardiogram which is still pending.  Added p.o. amlodipine for better blood pressure control per cardiologist recommendation.  1/18: Patient was seen and examined.  He remains asymptomatic.  Blood pressure is better controlled.  Echocardiogram done showed EF of 65 to 70% with impaired diastolic filling.  CBC and BMP show no significant findings.  Consider discharge if cardiology signs off.    Objective     Last Recorded Vitals  /70 (BP Location: Right arm, Patient Position: Lying)   Pulse 72   Temp 36.1 °C (97 °F) (Temporal)   Resp 16   Wt 47.8 kg (105 lb 4.8 oz)   SpO2 95%   Intake/Output last 3 Shifts:    Intake/Output Summary (Last 24 hours) at 1/18/2025 1201  Last data filed at 1/18/2025 1100  Gross per 24 hour   Intake 1065 ml   Output 800 ml   Net 265 ml       Admission Weight  Weight: 54.4 kg (120 lb) (01/16/25 1857)    Daily Weight  01/17/25 : 47.8 kg (105 lb 4.8 oz)    Image Results  Transthoracic Echo (TTE) [place] [address]       Phone [phone] Fax [phone]    TRANSTHORACIC ECHOCARDIOGRAM REPORT    Patient Name:       ROSALIND Hudson Physician:    [id number] Gwyn Puga MD  Study Date:         1/17/2025            Ordering Provider:    [id number] ELYSIA MACK  MRN/PID:            [id number]             Fellow:  Accession#:         [id number]         Nurse:                Mary Ann Clark  Date of Birth/Age:  1940  / 84 years Sonographer:          Anna Pavon RDCS  Gender Assigned at  M                    Additional Staff:  Birth:  Height:             162.56 cm            Admit Date:           1/16/2025  Weight:             47.63 kg             Admission Status:     Inpatient -                                                                 Routine  BSA / BMI:          1.49 m2 / 18.02      Department Location:  [address]                      kg/m2  Blood Pressure: 157 /87 mmHg    Study Type:    TRANSTHORACIC ECHO (TTE) COMPLETE  Diagnosis/ICD: Palpitations-R00.2  Indication:    Palp  CPT Codes:     Echo Complete w Full [id number]    Patient History:  Valve Disorders: Aortic Valve Replacement.  Pacer/Defib:     Permanent pacemaker    Study Detail: The following Echo studies were performed: 2D, M-Mode, Doppler and                color flow. Optison used as a contrast agent for endocardial                border definition. Total contrast used for this procedure was 3 mL                via IV push.       PHYSICIAN INTERPRETATION:  Left Ventricle: The left ventricular systolic function is normal, with a visually estimated ejection fraction of 65-70%. There are no regional wall motion abnormalities. The left ventricular cavity size is normal. There is mild increased septal and mildly increased posterior left ventricular wall thickness. There is left ventricular concentric remodeling. Abnormal (paradoxical) septal motion, consistent with RV pacemaker. Spectral Doppler shows a Grade I (impaired relaxation pattern) of left ventricular diastolic filling with normal left atrial filling pressure.  Left Atrium: The left atrium is normal in size. Increased thickness of the atrial septum (sparing the fossa ovalis) is present. This is consistent with lipomatous hypertrophy of the atrial septum.  Right Ventricle: The right ventricle is normal in size. There is normal right ventricular global systolic function. A device is visualized in  the right ventricle.  Right Atrium: The right atrium is normal in size. There is a device visualized in the right atrium.  Aortic Valve: There is a prosthetic aortic valve present. The aortic valve dimensionless index is 0.45. There is no evidence of aortic valve regurgitation. The peak instantaneous gradient of the aortic valve is 27 mmHg. The mean gradient of the aortic valve is 13 mmHg. There is unknown size aortic bioprosthetic valve with peak and mean gradient of 28/13 mmHg.  Mitral Valve: The mitral valve is mildly thickened. There is mild mitral valve regurgitation.  Tricuspid Valve: The tricuspid valve is structurally normal. There is mild tricuspid regurgitation. The Doppler estimated RVSP is mildly elevated right ventricular systolic pressure at 44.2 mmHg.  Pulmonic Valve: The pulmonic valve is structurally normal. There is physiologic pulmonic valve regurgitation.  Pericardium: No pericardial effusion noted.  Aorta: The aortic root is abnormal. There is mild dilatation of the ascending aorta. The aortic root is at the upper limits of normal size.  Systemic Veins: The inferior vena cava appears normal in size.       CONCLUSIONS:   1. The left ventricular systolic function is normal, with a visually estimated ejection fraction of 65-70%.   2. Spectral Doppler shows a Grade I (impaired relaxation pattern) of left ventricular diastolic filling with normal left atrial filling pressure.   3. Abnormal septal motion consistent with RV pacemaker.   4. There is normal right ventricular global systolic function.   5. Mildly elevated right ventricular systolic pressure.   6. There is unknown size aortic bioprosthetic valve with peak and mean gradient of 28/13 mmHg.   7. Lipomatous hypertrophy of the atrial septum.    QUANTITATIVE DATA SUMMARY:     2D MEASUREMENTS:           Normal Ranges:  IVSd:            1.07 cm   (0.6-1.1cm)  LVPWd:           1.12 cm   (0.6-1.1cm)  LVIDd:           4.05 cm   (3.9-5.9cm)  LVIDs:            2.12 cm  LV Mass Index:   99.3 g/m2  LV % FS          47.7 %       LA VOLUME:                    Normal Ranges:  LA Vol A4C:        41.9 ml    (22+/-6mL/m2)  LA Vol A2C:        43.5 ml  LA Vol BP:         42.7 ml  LA Vol Index A4C:  28.2ml/m2  LA Vol Index A2C:  29.3 ml/m2  LA Vol Index BP:   28.7 ml/m2  LA Area A4C:       15.7 cm2  LA Area A2C:       16.0 cm2  LA Major Axis A4C: 5.0 cm  LA Major Axis A2C: 5.0 cm  LA Volume Index:   29.0 ml/m2  LA Vol A4C:        38.3 ml  LA Vol A2C:        41.4 ml  LA Vol Index BSA:  26.8 ml/m2       RA VOLUME BY A/L METHOD:          Normal Ranges:  RA Area A4C:             14.0 cm2       M-MODE MEASUREMENTS:         Normal Ranges:  LAs:                 2.79 cm (2.7-4.0cm)       AORTA MEASUREMENTS:         Normal Ranges:  Ao Sinus, d:        3.70 cm (2.1-3.5cm)  Asc Ao, d:          4.10 cm (2.1-3.4cm)       LV SYSTOLIC FUNCTION BY 2D PLANIMETRY (MOD):                       Normal Ranges:  EF-A4C View:    67 % (>=55%)  EF-A2C View:    71 %  EF-Biplane:     74 %  EF-Visual:      68 %  LV EF Reported: 68 %       LV DIASTOLIC FUNCTION:             Normal Ranges:  MV Peak E:             0.91 m/s    (0.7-1.2 m/s)  MV Peak A:             1.08 m/s    (0.42-0.7 m/s)  E/A Ratio:             0.84        (1.0-2.2)  MV e'                  0.065 m/s   (>8.0)  MV lateral e'          0.08 m/s  MV medial e'           0.05 m/s  MV A Dur:              119.89 msec  E/e' Ratio:            13.90       (<8.0)       MITRAL VALVE:          Normal Ranges:  MV DT:        262 msec (150-240msec)       AORTIC VALVE:                      Normal Ranges:  AoV Vmax:                2.58 m/s  (<=1.7m/s)  AoV Peak P.7 mmHg (<20mmHg)  AoV Mean P.9 mmHg (1.7-11.5mmHg)  LVOT Max Brandt:            1.16 m/s  (<=1.1m/s)  AoV VTI:                 50.78 cm  (18-25cm)  LVOT VTI:                23.04 cm  LVOT Diameter:           1.77 cm   (1.8-2.4cm)  AoV Area, VTI:           1.00 cm2   (2.5-5.5cm2)  AoV Area,Vmax:           0.99 cm2  (2.5-4.5cm2)  AoV Dimensionless Index: 0.45       RIGHT VENTRICLE:  RV Basal 3.30 cm  RV Mid   2.10 cm  RV Major 6.5 cm  TAPSE:   14.4 mm  RV s'    0.10 m/s       TRICUSPID VALVE/RVSP:          Normal Ranges:  Peak TR Velocity:     3.21 m/s  RV Syst Pressure:     44 mmHg  (< 30mmHg)  TV E Vmax:            0.54 m/s (0.3-0.7m/s)  TV A Vmax:            0.37 m/s  TV P1/2 time:         83 msec  IVC Diam:             1.35 cm       PULMONIC VALVE:          Normal Ranges:  PV Max Brandt:     1.0 m/s  (0.6-0.9m/s)  PV Max P.2 mmHg       AORTA:  Asc Ao Diam 4.13 cm       [id number] Gwyn Puga MD  Electronically signed on 2025 at 4:47:44 PM       ** Final **  ECG 12 Lead  Atrial-sensed ventricular-paced rhythm  No further analysis attempted due to paced rhythm      Physical Exam  Vitals:    25 0900   BP: 122/70   Pulse: 72   Resp: 16   Temp: 36.1 °C (97 °F)   SpO2: 95%     Constitutional:       General: He is not in acute distress.     Appearance: He is underweight. He is ill-appearing.   HENT:      Head: Normocephalic and atraumatic.      Right Ear: External ear normal.      Left Ear: External ear normal.      Nose: Nose normal. No rhinorrhea.      Mouth/Throat:      Mouth: Mucous membranes are moist.      Pharynx: Oropharynx is clear. No oropharyngeal exudate.   Eyes:      General: No scleral icterus.        Right eye: No discharge.         Left eye: No discharge.      Conjunctiva/sclera: Conjunctivae normal.   Cardiovascular:      Rate and Rhythm: Normal rate and regular rhythm.      Pulses: Normal pulses.      Heart sounds: Normal heart sounds. No murmur heard.  Pulmonary:      Effort: Pulmonary effort is normal. No respiratory distress.      Breath sounds: Normal breath sounds. No wheezing or rales.   Abdominal:      General: Abdomen is flat. There is no distension.      Palpations: Abdomen is soft.      Tenderness: There is no abdominal tenderness.    Musculoskeletal:         General: No tenderness.      Right lower leg: No edema.      Left lower leg: No edema.   Skin:     General: Skin is warm and dry.      Capillary Refill: Capillary refill takes less than 2 seconds.      Findings: No rash.   Neurological:      General: No focal deficit present.      Mental Status: He is alert and oriented to person, place, and time. Mental status is at baseline.   Psychiatric:         Mood and Affect: Mood normal.         Behavior: Behavior normal.         Thought Content: Thought content normal.         Judgment: Judgment normal.         Relevant Results               Assessment/Plan                  Assessment & Plan  Palpitations    Palpitations  -Unclear etiology, patient not clearly able to describe symptoms in detail  -Monitor on telemetry for arrhythmia  -Cardiology consult for pacemaker evaluation and risk stratification  -TSH and magnesium are within normal limits s  -Better hydrated today  -Echocardiogram shows ejection fraction of 65 to 70%     Elevated troponin  -Adynamic in nature, unlikely to be representative of ACS  -Cardiology evaluation     Erythrocytosis  -Check carboxyhemoglobin and EPO levels  -Gently hydrate as noted above     Hyperglycemia  -HbA1c 5.7     Hypercalcemia  -Mild, check ionized calcium and PTH  -Hydrate as noted     Leukocytosis  Now resolved  -Unclear etiology no obvious evidence of infection  -UA is negative  -Trend with hydration       Malnutrition Diagnosis Status: New  Malnutrition Diagnosis: Severe malnutrition related to chronic disease or condition  As Evidenced by: severe muscle/fat loss , <75% estimated needs x 1 month, >5% weight loss x 1 month  I agree with the dietitian's malnutrition diagnosis.         Spent 35 minutes in the follow-up management of this patient today    Beata Salvador MD

## 2025-01-18 NOTE — PROGRESS NOTES
History Of Present Illness:    Patient is a 84-year-old male with history significant for Byrd's esophagus status post recent EGD on Tuesday presented to the emergency department with 1 day of elevated heart rate and elevated blood pressure. Patient has PMHx of aortic valve stenosis.  Patient reports that he has had recent EGD and has noticed his blood pressure was elevated at home he also noted his heart rate to be elevated which is why he decided to come to hospital.  Patient reported that his heart rate was in 110s and his blood pressure was 140/100.  He was concerned that bottom number is very high and he may have a stroke.  He denied any strokelike symptoms.  He denied any chest pain.  He denied any syncope.  He denied any shortness of breath.  Patient reports history of pacemaker and valve replacement.  He does not recall any other cardiac history at this time.  He follows with [facility]ology at [facility].     On arrival to hospital his cardiac enzymes were flatly elevated.  Cardiology was consulted for elevated heart rate and abnormal cardiac enzyme.       EKG showed paced rhythm otherwise unremarkable.       Subjective Data:  Patient has been without chest pain or palpitations.  He has been feeling well overnight.  He has been up in the room without consequence.  Review of telemetry strip shows paced rhythm without any abnormalities or ectopy.    Overnight Events:    None     Objective Data:  Last Recorded Vitals:  Vitals:    01/17/25 2003 01/18/25 0110 01/18/25 0512 01/18/25 0900   BP: 138/73 123/68 123/68 122/70   BP Location: Right arm Right arm Right arm Right arm   Patient Position: Lying Lying Lying Lying   Pulse: 74 71 68 72   Resp: 16 16 16 16   Temp: 36.8 °C (98.3 °F) 37.2 °C (99 °F) 37 °C (98.6 °F) 36.1 °C (97 °F)   TempSrc: Temporal Temporal Temporal Temporal   SpO2: 96% 94% 94% 95%   Weight:       Height:           Last Labs:  CBC - 1/18/2025:  5:42 AM  8.3 14.1 142    42.8      CMP - 1/18/2025:  5:42  AM  8.9 6.0 24 --- 0.4   2.7 3.3 15 63      PTT - No results in last year.  _   _ _     Results from last 7 days   Lab Units 01/18/25  0542 01/17/25  0403 01/16/25  2114   SODIUM mmol/L 138 138 136   POTASSIUM mmol/L 3.8 3.6 5.1   CHLORIDE mmol/L 105 102 99   CO2 mmol/L 28 28 30   BUN mg/dL 20 30* 31*   CREATININE mg/dL 0.67 0.64 0.73   GLUCOSE mg/dL 118* 114* 146*   CALCIUM mg/dL 8.9 9.6 10.4*      Results from last 7 days   Lab Units 01/18/25  0542 01/17/25  0403 01/16/25 2114   WBC AUTO x10*3/uL 8.3 11.8* 12.4*   HEMOGLOBIN g/dL 14.1 15.0 17.7*   HEMATOCRIT % 42.8 44.9 52.6*   PLATELETS AUTO x10*3/uL 142* 163 186      Results from last 7 days   Lab Units 01/18/25  0542   MAGNESIUM mg/dL 1.90      TROPHS   Date/Time Value Ref Range Status   01/17/2025 04:03 AM 46 0 - 20 ng/L Final   01/16/2025 10:18 PM 40 0 - 20 ng/L Final   01/16/2025 09:14 PM 42 0 - 20 ng/L Final     BNP   Date/Time Value Ref Range Status   01/16/2025 09:14  0 - 99 pg/mL Final     HGBA1C   Date/Time Value Ref Range Status   01/17/2025 04:03 AM 5.7 See comment % Final      Last I/O:  I/O last 3 completed shifts:  In: 1400 (29.3 mL/kg) [P.O.:400; I.V.:1000 (20.9 mL/kg)]  Out: 1000 (20.9 mL/kg) [Urine:1000 (0.6 mL/kg/hr)]  Weight: 47.8 kg     Past Cardiology Tests (Last 3 Years):  EKG:  ECG 12 Lead 01/16/2025 (Preliminary)    Echo:  Transthoracic Echo (TTE) Complete 01/17/2025  CONCLUSIONS:   1. The left ventricular systolic function is normal, with a visually estimated ejection fraction of 65-70%.   2. Spectral Doppler shows a Grade I (impaired relaxation pattern) of left ventricular diastolic filling with normal left atrial filling pressure.   3. Abnormal septal motion consistent with RV pacemaker.   4. There is normal right ventricular global systolic function.   5. Mildly elevated right ventricular systolic pressure.   6. There is unknown size aortic bioprosthetic valve with peak and mean gradient of 28/13 mmHg.   7. Lipomatous  hypertrophy of the atrial septum.  Ejection Fractions:  EF   Date/Time Value Ref Range Status   01/17/2025 02:45 PM 68 %      Cath:  No results found for this or any previous visit from the past 1095 days.    Stress Test:  No results found for this or any previous visit from the past 1095 days.    Cardiac Imaging:  No results found for this or any previous visit from the past 1095 days.      Inpatient Medications:  Scheduled medications   Medication Dose Route Frequency    amLODIPine  2.5 mg oral Daily    aspirin  81 mg oral Daily    metoprolol tartrate  25 mg oral BID    pantoprazole  40 mg oral BID AC    perflutren lipid microspheres  0.5-10 mL of dilution intravenous Once in imaging    sulfur hexafluoride microsphr  2 mL intravenous Once in imaging     PRN medications   Medication    acetaminophen    Or    acetaminophen    Or    acetaminophen    melatonin    ondansetron ODT    Or    ondansetron    polyethylene glycol     Continuous Medications   Medication Dose Last Rate       Physical Exam:  Elderly male in no acute distress.  No JVD or hepatojugular reflux  Lungs are clear AMP without wheezes or rales  S1-S2 is regular he does have a soft systolic murmur at the aortic space.  No S3 or S4.  Crisp prosthetic valve sounds are noted.  Abdomen is soft with normal bowel sounds.  He does not have any edema of the lower extremities.       Assessment/Plan   -Essential hypertension  -Tachycardia?  -History of complete heart block status post PPM s/p RV lead revision fracture.   -Aortic stenosis status post AVR   -Essential hypertension  -Hyperlipidemia        Plan:  -Patient comes to hospital due to concern for elevated blood pressure and heart rate. device interrogation was done.  No AT/A-fib or ventricular arrhythmia noted.  Outside cardiology notes were reviewed.  Patient is pacer dependent.  s/p dc pacemaker 2/2 CHB 2009, change out 12/2020 d/t MINDY, 2/2023 RV lead revision 2/2 fracture w/ CRTP device keeping old RV  lead on in LV port until new RV lead matures for safety.   -Will recommend to obtain TTE, need to evaluate for valvular dysfunction given murmur.  -Troponin flat not consistent with ACS.  Probably underlying valvular heart disease versus CHF?  -Will recommend to add amlodipine 2.5 mg daily for better blood pressure control.  -Continue metoprolol and aspirin as taking.     Cardiology will follow.    1/18/25 HTN:  BP appears to be controlled.  Will continue the metoprolol, norvac for home going.     1/18/25:  Tachycardia:   Patient is pacer dependent and telemetry shows paced rhythm,  Review of strips without further tachycardia.  Electrolytes are normal.   TSH was low but free T4 within normal limits.   Troponin is flat and stable.      1/18/25: Bioprosthetic AVR:   EF is normal with stage 1 diastolic dysfunction.   Peak and mean gradients across the valve 28/13 mmhg.  No valvular dysfunction noted.     Recommendations:   BP has been controlled.   HR has been controlled with paced rhythm.   Bioprosthetic AVR appears to be functioning ok with mean gradient 13 mmhg.   Would continue current medications and have patient follow up with [facility] cardiologist in 2-4 weeks.        Code Status:  Full Code    I spent 15 minutes in the professional and overall care of this patient.        Ev Orellana, APRN-CNP

## 2025-02-09 LAB
ATRIAL RATE: 77 BPM
P AXIS: -14 DEGREES
PR INTERVAL: 211 MS
Q ONSET: 249 MS
QRS COUNT: 12 BEATS
QRS DURATION: 153 MS
QT INTERVAL: 443 MS
QTC CALCULATION(BAZETT): 499 MS
QTC FREDERICIA: 479 MS
R AXIS: -89 DEGREES
T AXIS: 82 DEGREES
T OFFSET: 471 MS
VENTRICULAR RATE: 76 BPM

## 2025-07-01 ENCOUNTER — ANCILLARY ORDERS (OUTPATIENT)
Dept: GASTROENTEROLOGY | Facility: HOSPITAL | Age: 85
End: 2025-07-01

## 2025-07-01 DIAGNOSIS — C15.5 MALIGNANT NEOPLASM OF LOWER THIRD OF ESOPHAGUS (MULTI): Primary | ICD-10-CM

## 2025-07-30 ENCOUNTER — ANESTHESIA (OUTPATIENT)
Dept: GASTROENTEROLOGY | Facility: HOSPITAL | Age: 85
End: 2025-07-30
Payer: OTHER GOVERNMENT

## 2025-07-30 ENCOUNTER — HOSPITAL ENCOUNTER (OUTPATIENT)
Dept: GASTROENTEROLOGY | Facility: HOSPITAL | Age: 85
Discharge: HOME | End: 2025-07-30
Payer: OTHER GOVERNMENT

## 2025-07-30 ENCOUNTER — ANESTHESIA EVENT (OUTPATIENT)
Dept: GASTROENTEROLOGY | Facility: HOSPITAL | Age: 85
End: 2025-07-30
Payer: OTHER GOVERNMENT

## 2025-07-30 ENCOUNTER — HOSPITAL ENCOUNTER (OUTPATIENT)
Dept: CARDIOLOGY | Facility: HOSPITAL | Age: 85
Discharge: HOME | End: 2025-07-30
Payer: OTHER GOVERNMENT

## 2025-07-30 VITALS
DIASTOLIC BLOOD PRESSURE: 72 MMHG | HEIGHT: 64 IN | BODY MASS INDEX: 21.85 KG/M2 | OXYGEN SATURATION: 95 % | WEIGHT: 128 LBS | HEART RATE: 79 BPM | SYSTOLIC BLOOD PRESSURE: 145 MMHG | RESPIRATION RATE: 20 BRPM | TEMPERATURE: 97.7 F

## 2025-07-30 DIAGNOSIS — C15.5 MALIGNANT NEOPLASM OF LOWER THIRD OF ESOPHAGUS (MULTI): ICD-10-CM

## 2025-07-30 PROBLEM — I35.0 AORTIC STENOSIS: Status: ACTIVE | Noted: 2025-07-30

## 2025-07-30 PROCEDURE — 7100000010 HC PHASE TWO TIME - EACH INCREMENTAL 1 MINUTE

## 2025-07-30 PROCEDURE — 43254 EGD ENDO MUCOSAL RESECTION: CPT | Performed by: INTERNAL MEDICINE

## 2025-07-30 PROCEDURE — 2500000004 HC RX 250 GENERAL PHARMACY W/ HCPCS (ALT 636 FOR OP/ED): Performed by: ANESTHESIOLOGIST ASSISTANT

## 2025-07-30 PROCEDURE — 93286 PERI-PX EVAL PM/LDLS PM IP: CPT | Performed by: INTERNAL MEDICINE

## 2025-07-30 PROCEDURE — 2720000007 HC OR 272 NO HCPCS

## 2025-07-30 PROCEDURE — C1769 GUIDE WIRE: HCPCS

## 2025-07-30 PROCEDURE — 3700000002 HC GENERAL ANESTHESIA TIME - EACH INCREMENTAL 1 MINUTE

## 2025-07-30 PROCEDURE — 2500000004 HC RX 250 GENERAL PHARMACY W/ HCPCS (ALT 636 FOR OP/ED): Performed by: NURSE ANESTHETIST, CERTIFIED REGISTERED

## 2025-07-30 PROCEDURE — 3700000001 HC GENERAL ANESTHESIA TIME - INITIAL BASE CHARGE

## 2025-07-30 PROCEDURE — 93286 PERI-PX EVAL PM/LDLS PM IP: CPT

## 2025-07-30 PROCEDURE — 7100000009 HC PHASE TWO TIME - INITIAL BASE CHARGE

## 2025-07-30 PROCEDURE — 7100000002 HC RECOVERY ROOM TIME - EACH INCREMENTAL 1 MINUTE

## 2025-07-30 PROCEDURE — 7100000001 HC RECOVERY ROOM TIME - INITIAL BASE CHARGE

## 2025-07-30 RX ORDER — ACETAMINOPHEN 325 MG/1
650 TABLET ORAL EVERY 4 HOURS PRN
OUTPATIENT
Start: 2025-07-30

## 2025-07-30 RX ORDER — LIDOCAINE HCL/PF 100 MG/5ML
SYRINGE (ML) INTRAVENOUS AS NEEDED
Status: DISCONTINUED | OUTPATIENT
Start: 2025-07-30 | End: 2025-07-30

## 2025-07-30 RX ORDER — DROPERIDOL 2.5 MG/ML
0.62 INJECTION, SOLUTION INTRAMUSCULAR; INTRAVENOUS ONCE AS NEEDED
OUTPATIENT
Start: 2025-07-30

## 2025-07-30 RX ORDER — SODIUM CHLORIDE, SODIUM LACTATE, POTASSIUM CHLORIDE, CALCIUM CHLORIDE 600; 310; 30; 20 MG/100ML; MG/100ML; MG/100ML; MG/100ML
50 INJECTION, SOLUTION INTRAVENOUS CONTINUOUS
OUTPATIENT
Start: 2025-07-30 | End: 2025-07-30

## 2025-07-30 RX ORDER — ONDANSETRON HYDROCHLORIDE 2 MG/ML
INJECTION, SOLUTION INTRAVENOUS AS NEEDED
Status: DISCONTINUED | OUTPATIENT
Start: 2025-07-30 | End: 2025-07-30

## 2025-07-30 RX ORDER — PHENYLEPHRINE HYDROCHLORIDE 10 MG/ML
INJECTION INTRAVENOUS AS NEEDED
Status: DISCONTINUED | OUTPATIENT
Start: 2025-07-30 | End: 2025-07-30

## 2025-07-30 RX ORDER — ALBUTEROL SULFATE 0.83 MG/ML
2.5 SOLUTION RESPIRATORY (INHALATION) ONCE AS NEEDED
OUTPATIENT
Start: 2025-07-30

## 2025-07-30 RX ORDER — ROCURONIUM BROMIDE 10 MG/ML
INJECTION, SOLUTION INTRAVENOUS AS NEEDED
Status: DISCONTINUED | OUTPATIENT
Start: 2025-07-30 | End: 2025-07-30

## 2025-07-30 RX ORDER — PROPOFOL 10 MG/ML
INJECTION, EMULSION INTRAVENOUS AS NEEDED
Status: DISCONTINUED | OUTPATIENT
Start: 2025-07-30 | End: 2025-07-30

## 2025-07-30 RX ORDER — SODIUM CHLORIDE, SODIUM LACTATE, POTASSIUM CHLORIDE, CALCIUM CHLORIDE 600; 310; 30; 20 MG/100ML; MG/100ML; MG/100ML; MG/100ML
INJECTION, SOLUTION INTRAVENOUS CONTINUOUS PRN
Status: DISCONTINUED | OUTPATIENT
Start: 2025-07-30 | End: 2025-07-30

## 2025-07-30 RX ORDER — LIDOCAINE IN NACL,ISO-OSMOT/PF 30 MG/3 ML
0.1 SYRINGE (ML) INJECTION ONCE
OUTPATIENT
Start: 2025-07-30 | End: 2025-07-30

## 2025-07-30 RX ORDER — ONDANSETRON HYDROCHLORIDE 2 MG/ML
4 INJECTION, SOLUTION INTRAVENOUS ONCE AS NEEDED
OUTPATIENT
Start: 2025-07-30

## 2025-07-30 RX ADMIN — PROPOFOL 20 MG: 10 INJECTION, EMULSION INTRAVENOUS at 14:21

## 2025-07-30 RX ADMIN — SODIUM CHLORIDE, POTASSIUM CHLORIDE, SODIUM LACTATE AND CALCIUM CHLORIDE: 600; 310; 30; 20 INJECTION, SOLUTION INTRAVENOUS at 14:00

## 2025-07-30 RX ADMIN — SUGAMMADEX 300 MG: 100 INJECTION, SOLUTION INTRAVENOUS at 15:24

## 2025-07-30 RX ADMIN — PROPOFOL 80 MG: 10 INJECTION, EMULSION INTRAVENOUS at 14:17

## 2025-07-30 RX ADMIN — PHENYLEPHRINE HYDROCHLORIDE 80 MCG: 10 INJECTION INTRAVENOUS at 14:17

## 2025-07-30 RX ADMIN — LIDOCAINE HYDROCHLORIDE 60 MG: 20 INJECTION INTRAVENOUS at 14:17

## 2025-07-30 RX ADMIN — PROPOFOL 30 MG: 10 INJECTION, EMULSION INTRAVENOUS at 14:35

## 2025-07-30 RX ADMIN — ROCURONIUM BROMIDE 50 MG: 10 INJECTION INTRAVENOUS at 14:17

## 2025-07-30 RX ADMIN — PROPOFOL 20 MG: 10 INJECTION, EMULSION INTRAVENOUS at 15:22

## 2025-07-30 RX ADMIN — ROCURONIUM BROMIDE 10 MG: 10 INJECTION INTRAVENOUS at 15:20

## 2025-07-30 RX ADMIN — PROPOFOL 20 MG: 10 INJECTION, EMULSION INTRAVENOUS at 14:40

## 2025-07-30 RX ADMIN — ONDANSETRON 4 MG: 2 INJECTION INTRAMUSCULAR; INTRAVENOUS at 15:24

## 2025-07-30 RX ADMIN — PHENYLEPHRINE HYDROCHLORIDE 80 MCG: 10 INJECTION INTRAVENOUS at 14:29

## 2025-07-30 ASSESSMENT — COLUMBIA-SUICIDE SEVERITY RATING SCALE - C-SSRS
6. HAVE YOU EVER DONE ANYTHING, STARTED TO DO ANYTHING, OR PREPARED TO DO ANYTHING TO END YOUR LIFE?: NO
2. HAVE YOU ACTUALLY HAD ANY THOUGHTS OF KILLING YOURSELF?: NO
1. IN THE PAST MONTH, HAVE YOU WISHED YOU WERE DEAD OR WISHED YOU COULD GO TO SLEEP AND NOT WAKE UP?: NO
1. IN THE PAST MONTH, HAVE YOU WISHED YOU WERE DEAD OR WISHED YOU COULD GO TO SLEEP AND NOT WAKE UP?: NO
6. HAVE YOU EVER DONE ANYTHING, STARTED TO DO ANYTHING, OR PREPARED TO DO ANYTHING TO END YOUR LIFE?: NO
2. HAVE YOU ACTUALLY HAD ANY THOUGHTS OF KILLING YOURSELF?: NO

## 2025-07-30 ASSESSMENT — PAIN SCALES - GENERAL
PAINLEVEL_OUTOF10: 0 - NO PAIN

## 2025-07-30 ASSESSMENT — PAIN - FUNCTIONAL ASSESSMENT
PAIN_FUNCTIONAL_ASSESSMENT: 0-10

## 2025-07-30 NOTE — H&P
Subjective     History of Present Illness:   Maksim Palmer is a 85 y.o. male who presents to endoscopy    Physical Exam  General: not in acute distress  CV: regular rate and rhythm  Resp: non-labored breathing

## 2025-07-30 NOTE — ANESTHESIA PREPROCEDURE EVALUATION
Patient: Maksim Palmer    Procedure Information       Date/Time: 07/30/25 1300    Scheduled providers: Jacinto Murray MD    Procedure: EGD    Location: [facility]            Relevant Problems   Cardiac   (+) AV block (Complete heart block s/p PPM placement)   (+) Aortic stenosis (S/p AVR 2008)   (+) CAD (coronary artery disease)   (+) Hypertension   (+) Pacemaker      Digestive   (+) Byrd esophagus       Clinical information reviewed:   Tobacco  Allergies  Meds   Med Hx  Surg Hx   Fam Hx  Soc Hx        NPO Detail:  NPO/Void Status  Date of Last Liquid: 07/29/25  Date of Last Solid: 07/29/25         Physical Exam    Airway  Mallampati: III  TM distance: >3 FB  Neck ROM: full  Mouth opening: 3 or more finger widths     Cardiovascular   Rhythm: regular  Rate: normal     Dental     (+) edentulous     Pulmonary (+) rhonchi     Abdominal        TTE 1/17/2025  CONCLUSIONS:   1. The left ventricular systolic function is normal, with a visually estimated ejection fraction of 65-70%.   2. Spectral Doppler shows a Grade I (impaired relaxation pattern) of left ventricular diastolic filling with normal left atrial filling pressure.   3. Abnormal septal motion consistent with RV pacemaker.   4. There is normal right ventricular global systolic function.   5. Mildly elevated right ventricular systolic pressure.   6. There is unknown size aortic bioprosthetic valve with peak and mean gradient of 28/13 mmHg.   7. Lipomatous hypertrophy of the atrial septum.    Anesthesia Plan    History of general anesthesia?: yes  History of complications of general anesthesia?: no    ASA 3     general   (PPM interrogated preop - normal function, battery and magnet rate wnl, sensing and capture thresholds appropriate. DDD . Magnet to be used with cautery. )  intravenous induction   Anesthetic plan and risks discussed with patient.    Plan discussed with attending and CRNA.

## 2025-07-30 NOTE — ANESTHESIA POSTPROCEDURE EVALUATION
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)    Scheduled Providers: Jacinto Murray MD Responsible Provider: Bhavik Rose MD    Anesthesia Type: general ASA Status: 3            Anesthesia Type: general    Vitals Value Taken Time   /89 07/30/25 15:42   Temp 36.5 °C (97.7 °F) 07/30/25 15:42   Pulse 72 07/30/25 15:42   Resp 18 07/30/25 15:42   SpO2 98 % 07/30/25 15:42       Anesthesia Post Evaluation    Patient location during evaluation: PACU  Patient participation: complete - patient cannot participate  Level of consciousness: responsive to light touch  Pain management: adequate  Airway patency: patent  Cardiovascular status: acceptable and hemodynamically stable  Respiratory status: acceptable and spontaneous ventilation  Hydration status: acceptable  Postoperative Nausea and Vomiting: none        There were no known notable events for this encounter.

## 2025-07-30 NOTE — ANESTHESIA PROCEDURE NOTES
Airway  Date/Time: 7/30/2025 2:20 PM  Reason: elective    Airway not difficult    Staffing  Performed: CRNA   Authorized by: Bhavik Rose MD    Performed by: QUINCY Velazquez-YVES  Patient location during procedure: OR    Patient Condition  Indications for airway management: anesthesia  Patient position: sniffing  MILS maintained throughout  Planned trial extubation  Sedation level: deep     Final Airway Details   Preoxygenated: yes  Final airway type: endotracheal airway  Successful airway: ETT  Cuffed: yes   Successful intubation technique: direct laryngoscopy  Adjuncts used in placement: intubating stylet  Endotracheal tube insertion site: oral  Blade: Rae  Blade size: #3  ETT size (mm): 7.0  Cormack-Lehane Classification: grade I - full view of glottis  Placement verified by: chest auscultation and capnometry   Measured from: lips  ETT to lips (cm): 21  Ventilation between attempts: none  Number of attempts at approach: 1  Number of other approaches attempted: 0    Additional Comments  Lips and gums in preanesthetic condition

## 2025-08-07 LAB
LABORATORY COMMENT REPORT: NORMAL
PATH REPORT.FINAL DX SPEC: NORMAL
PATH REPORT.GROSS SPEC: NORMAL
PATH REPORT.RELEVANT HX SPEC: NORMAL
PATH REPORT.TOTAL CANCER: NORMAL

## 2025-08-08 ENCOUNTER — TELEPHONE (OUTPATIENT)
Dept: GASTROENTEROLOGY | Facility: HOSPITAL | Age: 85
End: 2025-08-08
Payer: OTHER GOVERNMENT

## 2025-08-08 NOTE — TELEPHONE ENCOUNTER
Pathology department called to alert Dr. Murray of patient's pathology that had resulted. I sent a secure chat to Dr. Murray to inform him.